# Patient Record
Sex: MALE | Race: WHITE | NOT HISPANIC OR LATINO | URBAN - METROPOLITAN AREA
[De-identification: names, ages, dates, MRNs, and addresses within clinical notes are randomized per-mention and may not be internally consistent; named-entity substitution may affect disease eponyms.]

---

## 2017-08-13 ENCOUNTER — INPATIENT (INPATIENT)
Facility: HOSPITAL | Age: 72
LOS: 5 days | Discharge: HOME | DRG: 872 | End: 2017-08-19
Attending: INTERNAL MEDICINE | Admitting: INTERNAL MEDICINE
Payer: MEDICARE

## 2017-08-13 VITALS
SYSTOLIC BLOOD PRESSURE: 123 MMHG | RESPIRATION RATE: 16 BRPM | DIASTOLIC BLOOD PRESSURE: 71 MMHG | HEIGHT: 70 IN | HEART RATE: 64 BPM | TEMPERATURE: 99 F | OXYGEN SATURATION: 95 % | WEIGHT: 190.04 LBS

## 2017-08-13 DIAGNOSIS — A41.9 SEPSIS, UNSPECIFIED ORGANISM: ICD-10-CM

## 2017-08-13 DIAGNOSIS — R41.0 DISORIENTATION, UNSPECIFIED: ICD-10-CM

## 2017-08-13 DIAGNOSIS — G93.40 ENCEPHALOPATHY, UNSPECIFIED: ICD-10-CM

## 2017-08-13 DIAGNOSIS — N30.00 ACUTE CYSTITIS WITHOUT HEMATURIA: ICD-10-CM

## 2017-08-13 DIAGNOSIS — Z95.5 PRESENCE OF CORONARY ANGIOPLASTY IMPLANT AND GRAFT: ICD-10-CM

## 2017-08-13 DIAGNOSIS — I25.10 ATHEROSCLEROTIC HEART DISEASE OF NATIVE CORONARY ARTERY WITHOUT ANGINA PECTORIS: ICD-10-CM

## 2017-08-13 DIAGNOSIS — I10 ESSENTIAL (PRIMARY) HYPERTENSION: ICD-10-CM

## 2017-08-13 DIAGNOSIS — R91.1 SOLITARY PULMONARY NODULE: ICD-10-CM

## 2017-08-13 DIAGNOSIS — Z85.51 PERSONAL HISTORY OF MALIGNANT NEOPLASM OF BLADDER: ICD-10-CM

## 2017-08-13 DIAGNOSIS — R50.9 FEVER, UNSPECIFIED: ICD-10-CM

## 2017-08-13 DIAGNOSIS — Z87.891 PERSONAL HISTORY OF NICOTINE DEPENDENCE: ICD-10-CM

## 2017-08-13 DIAGNOSIS — Z98.890 OTHER SPECIFIED POSTPROCEDURAL STATES: Chronic | ICD-10-CM

## 2017-08-13 DIAGNOSIS — I25.2 OLD MYOCARDIAL INFARCTION: ICD-10-CM

## 2017-08-13 DIAGNOSIS — N39.0 URINARY TRACT INFECTION, SITE NOT SPECIFIED: ICD-10-CM

## 2017-08-13 DIAGNOSIS — Z79.82 LONG TERM (CURRENT) USE OF ASPIRIN: ICD-10-CM

## 2017-08-13 DIAGNOSIS — F41.9 ANXIETY DISORDER, UNSPECIFIED: ICD-10-CM

## 2017-08-13 DIAGNOSIS — C67.9 MALIGNANT NEOPLASM OF BLADDER, UNSPECIFIED: ICD-10-CM

## 2017-08-13 DIAGNOSIS — Z95.5 PRESENCE OF CORONARY ANGIOPLASTY IMPLANT AND GRAFT: Chronic | ICD-10-CM

## 2017-08-13 DIAGNOSIS — Z29.9 ENCOUNTER FOR PROPHYLACTIC MEASURES, UNSPECIFIED: ICD-10-CM

## 2017-08-13 LAB
ALBUMIN SERPL ELPH-MCNC: 3.2 G/DL — LOW (ref 3.3–5)
ALP SERPL-CCNC: 54 U/L — SIGNIFICANT CHANGE UP (ref 40–120)
ALT FLD-CCNC: 18 U/L — SIGNIFICANT CHANGE UP (ref 12–78)
ANION GAP SERPL CALC-SCNC: 8 MMOL/L — SIGNIFICANT CHANGE UP (ref 5–17)
APPEARANCE UR: ABNORMAL
APTT BLD: 28.4 SEC — SIGNIFICANT CHANGE UP (ref 27.5–37.4)
AST SERPL-CCNC: 13 U/L — LOW (ref 15–37)
BACTERIA # UR AUTO: ABNORMAL
BASOPHILS # BLD AUTO: 0.1 K/UL — SIGNIFICANT CHANGE UP (ref 0–0.2)
BASOPHILS NFR BLD AUTO: 0.3 % — SIGNIFICANT CHANGE UP (ref 0–2)
BILIRUB SERPL-MCNC: 2 MG/DL — HIGH (ref 0.2–1.2)
BILIRUB UR-MCNC: ABNORMAL
BUN SERPL-MCNC: 21 MG/DL — SIGNIFICANT CHANGE UP (ref 7–23)
CALCIUM SERPL-MCNC: 10 MG/DL — SIGNIFICANT CHANGE UP (ref 8.5–10.1)
CHLORIDE SERPL-SCNC: 106 MMOL/L — SIGNIFICANT CHANGE UP (ref 96–108)
CO2 SERPL-SCNC: 24 MMOL/L — SIGNIFICANT CHANGE UP (ref 22–31)
COLOR SPEC: YELLOW — SIGNIFICANT CHANGE UP
COMMENT - URINE: SIGNIFICANT CHANGE UP
CREAT SERPL-MCNC: 1.2 MG/DL — SIGNIFICANT CHANGE UP (ref 0.5–1.3)
DIFF PNL FLD: ABNORMAL
EOSINOPHIL # BLD AUTO: 0 K/UL — SIGNIFICANT CHANGE UP (ref 0–0.5)
EOSINOPHIL NFR BLD AUTO: 0 % — SIGNIFICANT CHANGE UP (ref 0–6)
EPI CELLS # UR: SIGNIFICANT CHANGE UP
GLUCOSE SERPL-MCNC: 158 MG/DL — HIGH (ref 70–99)
GLUCOSE UR QL: NEGATIVE — SIGNIFICANT CHANGE UP
HCT VFR BLD CALC: 44.4 % — SIGNIFICANT CHANGE UP (ref 39–50)
HGB BLD-MCNC: 15.2 G/DL — SIGNIFICANT CHANGE UP (ref 13–17)
INR BLD: 1.2 RATIO — HIGH (ref 0.88–1.16)
KETONES UR-MCNC: ABNORMAL
LACTATE SERPL-SCNC: 1.5 MMOL/L — SIGNIFICANT CHANGE UP (ref 0.7–2)
LEUKOCYTE ESTERASE UR-ACNC: ABNORMAL
LYMPHOCYTES # BLD AUTO: 0.9 K/UL — LOW (ref 1–3.3)
LYMPHOCYTES # BLD AUTO: 5 % — LOW (ref 13–44)
MCHC RBC-ENTMCNC: 32.3 PG — SIGNIFICANT CHANGE UP (ref 27–34)
MCHC RBC-ENTMCNC: 34.1 GM/DL — SIGNIFICANT CHANGE UP (ref 32–36)
MCV RBC AUTO: 94.5 FL — SIGNIFICANT CHANGE UP (ref 80–100)
MONOCYTES # BLD AUTO: 1.3 K/UL — HIGH (ref 0–0.9)
MONOCYTES NFR BLD AUTO: 7.1 % — SIGNIFICANT CHANGE UP (ref 1–9)
NEUTROPHILS # BLD AUTO: 15.8 K/UL — HIGH (ref 1.8–7.4)
NEUTROPHILS NFR BLD AUTO: 87.6 % — HIGH (ref 43–77)
NITRITE UR-MCNC: POSITIVE
PH UR: 5 — SIGNIFICANT CHANGE UP (ref 5–8)
PLATELET # BLD AUTO: 161 K/UL — SIGNIFICANT CHANGE UP (ref 150–400)
POTASSIUM SERPL-MCNC: 4.1 MMOL/L — SIGNIFICANT CHANGE UP (ref 3.5–5.3)
POTASSIUM SERPL-SCNC: 4.1 MMOL/L — SIGNIFICANT CHANGE UP (ref 3.5–5.3)
PROT SERPL-MCNC: 7.5 G/DL — SIGNIFICANT CHANGE UP (ref 6–8.3)
PROT UR-MCNC: 75 MG/DL
PROTHROM AB SERPL-ACNC: 13.1 SEC — HIGH (ref 9.8–12.7)
RBC # BLD: 4.7 M/UL — SIGNIFICANT CHANGE UP (ref 4.2–5.8)
RBC # FLD: 11.6 % — SIGNIFICANT CHANGE UP (ref 10.3–14.5)
RBC CASTS # UR COMP ASSIST: ABNORMAL /HPF (ref 0–4)
SODIUM SERPL-SCNC: 138 MMOL/L — SIGNIFICANT CHANGE UP (ref 135–145)
SP GR SPEC: 1.02 — SIGNIFICANT CHANGE UP (ref 1.01–1.02)
UROBILINOGEN FLD QL: 4
WBC # BLD: 18 K/UL — HIGH (ref 3.8–10.5)
WBC # FLD AUTO: 18 K/UL — HIGH (ref 3.8–10.5)
WBC UR QL: ABNORMAL

## 2017-08-13 PROCEDURE — 99285 EMERGENCY DEPT VISIT HI MDM: CPT

## 2017-08-13 PROCEDURE — 93010 ELECTROCARDIOGRAM REPORT: CPT

## 2017-08-13 PROCEDURE — 71020: CPT | Mod: 26

## 2017-08-13 RX ORDER — LISINOPRIL 2.5 MG/1
20 TABLET ORAL DAILY
Qty: 0 | Refills: 0 | Status: DISCONTINUED | OUTPATIENT
Start: 2017-08-13 | End: 2017-08-13

## 2017-08-13 RX ORDER — ACETAMINOPHEN 500 MG
650 TABLET ORAL ONCE
Qty: 0 | Refills: 0 | Status: COMPLETED | OUTPATIENT
Start: 2017-08-13 | End: 2017-08-13

## 2017-08-13 RX ORDER — ASPIRIN/CALCIUM CARB/MAGNESIUM 324 MG
81 TABLET ORAL DAILY
Qty: 0 | Refills: 0 | Status: DISCONTINUED | OUTPATIENT
Start: 2017-08-13 | End: 2017-08-19

## 2017-08-13 RX ORDER — ASPIRIN/CALCIUM CARB/MAGNESIUM 324 MG
1 TABLET ORAL
Qty: 0 | Refills: 0 | COMMUNITY

## 2017-08-13 RX ORDER — RAMIPRIL 5 MG
1 CAPSULE ORAL
Qty: 0 | Refills: 0 | COMMUNITY

## 2017-08-13 RX ORDER — ACETAMINOPHEN 500 MG
650 TABLET ORAL EVERY 6 HOURS
Qty: 0 | Refills: 0 | Status: DISCONTINUED | OUTPATIENT
Start: 2017-08-13 | End: 2017-08-19

## 2017-08-13 RX ORDER — LACTOBACILLUS ACIDOPHILUS 100MM CELL
1 CAPSULE ORAL EVERY 8 HOURS
Qty: 0 | Refills: 0 | Status: DISCONTINUED | OUTPATIENT
Start: 2017-08-13 | End: 2017-08-19

## 2017-08-13 RX ORDER — CEFTRIAXONE 500 MG/1
1 INJECTION, POWDER, FOR SOLUTION INTRAMUSCULAR; INTRAVENOUS ONCE
Qty: 0 | Refills: 0 | Status: COMPLETED | OUTPATIENT
Start: 2017-08-13 | End: 2017-08-13

## 2017-08-13 RX ORDER — ATORVASTATIN CALCIUM 80 MG/1
1 TABLET, FILM COATED ORAL
Qty: 0 | Refills: 0 | COMMUNITY

## 2017-08-13 RX ORDER — ATORVASTATIN CALCIUM 80 MG/1
20 TABLET, FILM COATED ORAL AT BEDTIME
Qty: 0 | Refills: 0 | Status: DISCONTINUED | OUTPATIENT
Start: 2017-08-13 | End: 2017-08-19

## 2017-08-13 RX ORDER — CEFTRIAXONE 500 MG/1
1 INJECTION, POWDER, FOR SOLUTION INTRAMUSCULAR; INTRAVENOUS EVERY 24 HOURS
Qty: 0 | Refills: 0 | Status: DISCONTINUED | OUTPATIENT
Start: 2017-08-14 | End: 2017-08-16

## 2017-08-13 RX ORDER — CHOLECALCIFEROL (VITAMIN D3) 125 MCG
1 CAPSULE ORAL
Qty: 0 | Refills: 0 | COMMUNITY

## 2017-08-13 RX ORDER — ENOXAPARIN SODIUM 100 MG/ML
40 INJECTION SUBCUTANEOUS EVERY 24 HOURS
Qty: 0 | Refills: 0 | Status: DISCONTINUED | OUTPATIENT
Start: 2017-08-13 | End: 2017-08-19

## 2017-08-13 RX ORDER — ATENOLOL 25 MG/1
25 TABLET ORAL DAILY
Qty: 0 | Refills: 0 | Status: DISCONTINUED | OUTPATIENT
Start: 2017-08-13 | End: 2017-08-13

## 2017-08-13 RX ORDER — SODIUM CHLORIDE 9 MG/ML
1000 INJECTION INTRAMUSCULAR; INTRAVENOUS; SUBCUTANEOUS ONCE
Qty: 0 | Refills: 0 | Status: COMPLETED | OUTPATIENT
Start: 2017-08-13 | End: 2017-08-13

## 2017-08-13 RX ORDER — SODIUM CHLORIDE 9 MG/ML
1000 INJECTION INTRAMUSCULAR; INTRAVENOUS; SUBCUTANEOUS
Qty: 0 | Refills: 0 | Status: COMPLETED | OUTPATIENT
Start: 2017-08-13 | End: 2017-08-13

## 2017-08-13 RX ORDER — ATENOLOL 25 MG/1
25 TABLET ORAL DAILY
Qty: 0 | Refills: 0 | Status: DISCONTINUED | OUTPATIENT
Start: 2017-08-13 | End: 2017-08-19

## 2017-08-13 RX ORDER — SODIUM CHLORIDE 9 MG/ML
1000 INJECTION INTRAMUSCULAR; INTRAVENOUS; SUBCUTANEOUS
Qty: 0 | Refills: 0 | Status: DISCONTINUED | OUTPATIENT
Start: 2017-08-13 | End: 2017-08-16

## 2017-08-13 RX ORDER — ENOXAPARIN SODIUM 100 MG/ML
40 INJECTION SUBCUTANEOUS DAILY
Qty: 0 | Refills: 0 | Status: DISCONTINUED | OUTPATIENT
Start: 2017-08-13 | End: 2017-08-13

## 2017-08-13 RX ORDER — ATENOLOL 25 MG/1
1 TABLET ORAL
Qty: 0 | Refills: 0 | COMMUNITY

## 2017-08-13 RX ADMIN — SODIUM CHLORIDE 75 MILLILITER(S): 9 INJECTION INTRAMUSCULAR; INTRAVENOUS; SUBCUTANEOUS at 17:09

## 2017-08-13 RX ADMIN — Medication 1 TABLET(S): at 21:32

## 2017-08-13 RX ADMIN — Medication 650 MILLIGRAM(S): at 11:41

## 2017-08-13 RX ADMIN — Medication 650 MILLIGRAM(S): at 17:09

## 2017-08-13 RX ADMIN — SODIUM CHLORIDE 1000 MILLILITER(S): 9 INJECTION INTRAMUSCULAR; INTRAVENOUS; SUBCUTANEOUS at 11:41

## 2017-08-13 RX ADMIN — CEFTRIAXONE 100 GRAM(S): 500 INJECTION, POWDER, FOR SOLUTION INTRAMUSCULAR; INTRAVENOUS at 11:41

## 2017-08-13 RX ADMIN — Medication 81 MILLIGRAM(S): at 21:31

## 2017-08-13 RX ADMIN — SODIUM CHLORIDE 1000 MILLILITER(S): 9 INJECTION INTRAMUSCULAR; INTRAVENOUS; SUBCUTANEOUS at 11:42

## 2017-08-13 RX ADMIN — ATORVASTATIN CALCIUM 20 MILLIGRAM(S): 80 TABLET, FILM COATED ORAL at 21:31

## 2017-08-13 RX ADMIN — SODIUM CHLORIDE 2000 MILLILITER(S): 9 INJECTION INTRAMUSCULAR; INTRAVENOUS; SUBCUTANEOUS at 14:43

## 2017-08-13 NOTE — H&P ADULT - PROBLEM SELECTOR PLAN 1
WBC 18.1, T103.2F; /70; RR 14; O2 95%  Pt meets SIRS criteria at time of admission and has evidence of UTI on UA.   Plan:  continue ceftriaxone 1g IV until cultures finalized  continue Tylenol PRN for fevers  f/u urine, blood cultures  Zofran PRN for nausea/vomiting  Encourage PO intake of fluids as tolerated Likely 2/2 UTI  Admit to [facility]  continue ceftriaxone 1g IV until cultures finalized  continue Tylenol PRN for fevers  f/u urine, blood cultures  Zofran PRN for nausea/vomiting  Encourage PO intake of fluids as tolerated Likely 2/2 UTI  Admit to [facility]  continue ceftriaxone 1g IV until cultures finalized  continue Tylenol PRN for fevers  continue IV NS @ 75 ml/hr  f/u urine, blood cultures  Zofran PRN for nausea/vomiting  Encourage PO intake of fluids as tolerated

## 2017-08-13 NOTE — H&P ADULT - FAMILY HISTORY
Father  Still living? Unknown  Family history of pancreatic cancer, Age at diagnosis: Age Unknown  Family history of heart disease, Age at diagnosis: Age Unknown     Mother  Still living? Unknown  Family history of heart disease, Age at diagnosis: Age Unknown     Sibling  Still living? Unknown  Family history of colon cancer, Age at diagnosis: Age Unknown

## 2017-08-13 NOTE — H&P ADULT - PROBLEM SELECTOR PLAN 2
UA (+) LE, nitrites, moderate bacteria  Plan:  as above chronic; stable. Continue atorvastatin, atenolol chronic; stable. Continue atorvastatin, atenolol  abnormal ekg as noted- will contact his cardiologist's office in am tomorrow to have old ekg faxed

## 2017-08-13 NOTE — CONSULT NOTE ADULT - SUBJECTIVE AND OBJECTIVE BOX
coverage for Dr. Lan    Patient is a 72y old  Male who presents with a chief complaint of fever and chills x 2 days.    HISTORY OF PRESENT ILLNESS:  71 y/o male with history of bladder cancer, s/p TURBT 2013 by Dr. Lan, had surveillance cystoscopy 5 days ago, now c/o fevers and chills.    PAST MEDICAL & SURGICAL HISTORY:  Bladder cancer  HTN (hypertension)  H/O heart artery stent: x2 ()  S/P hernia surgery: ()      REVIEW OF SYSTEMS:    CONSTITUTIONAL: No weakness. (+) fevers and chills  SKIN: No itching, burning, rashes, or lesions   EYES/ENT: No visual changes;  No vertigo or throat pain   NECK: No pain or stiffness  RESPIRATORY: No cough, wheezing, hemoptysis; No shortness of breath  CARDIOVASCULAR: No chest pain or palpitations  GASTROINTESTINAL: No abdominal or epigastric pain. No nausea, vomiting, diarrhea  NEUROLOGICAL: No numbness or weakness    All other review of systems is negative unless indicated above.    MEDICATIONS  (STANDING):  aspirin  chewable 81 milliGRAM(s) Oral daily  lisinopril 20 milliGRAM(s) Oral daily  atorvastatin 20 milliGRAM(s) Oral at bedtime  ATENolol  Tablet 25 milliGRAM(s) Oral daily    Allergies    No Known Allergies      SOCIAL HISTORY:   Smoking: former smoker   Work:       FAMILY HISTORY:      Vital Signs Last 24 Hrs  T(C): 39.6 (13 Aug 2017 11:03), Max: 39.6 (13 Aug 2017 11:03)  T(F): 103.2 (13 Aug 2017 11:03), Max: 103.2 (13 Aug 2017 11:03)  HR: 66 (13 Aug 2017 11:03) (64 - 66)  BP: 120/70 (13 Aug 2017 11:03) (120/70 - 123/71)  BP(mean): --  RR: 16 (13 Aug 2017 11:03) (16 - 16)  SpO2: 96% (13 Aug 2017 11:03) (95% - 96%)    PHYSICAL EXAM:    Constitutional: NAD, well-developed  HEENT: PERRLA, EOMI  Neck: Supple, full ROM  Back: No CVA tenderness  Respiratory: Normal repiratory effort  Cardiovascular: RRR  Abd: Soft, NT/ND, no suprapubic distention or tenderness  : Normal phallus,open meatus,bilateral descended testes, non-tender  Extremities: No peripheral edema  Vascular: 2+ peripheral pulses  Neurological: A&O x 3, no focal deficits  Psychiatric: Normal mood, normal affect  Musculoskeletal: no clubbing/cyanosis/edema  Skin: No rashes    LABS:                        15.2   18.0  )-----------( 161      ( 13 Aug 2017 11:07 )             44.4     08-13    138  |  106  |  21  ----------------------------<  158<H>  4.1   |  24  |  1.20       PTT - ( 13 Aug 2017 11:07 )  PTT:28.4 sec  Urinalysis Basic - ( 13 Aug 2017 11:07 )    Color: Yellow / Appearance: Turbid / S.025 / pH: x  Gluc: x / Ketone: Small  / Bili: Small / Urobili: 4   Blood: x / Protein: 75 mg/dL / Nitrite: Positive   Leuk Esterase: Moderate / RBC: 3-5 /HPF / WBC 11-25   Sq Epi: x / Non Sq Epi: x / Bacteria: Moderate

## 2017-08-13 NOTE — ED ADULT NURSE REASSESSMENT NOTE - NS ED NURSE REASSESS COMMENT FT1
Sepsis resident, Nayeli RUIZ called and advised on potential sepsis indication as patient has temp of 103.2F

## 2017-08-13 NOTE — H&P ADULT - NSHPOUTPATIENTPROVIDERS_GEN_ALL_CORE
Dr. Victor M Lan (Urologist)  Dr. Peter Weil (PMD)  Dr. Dean Longo (Cardiologist)  Dr. Filipe Longo (GI)

## 2017-08-13 NOTE — H&P ADULT - HISTORY OF PRESENT ILLNESS
Pt is a 73 y/o M with PMHx of HTN, CAD (s/p 2 stents, 1995), bladder cancer (in remission; dx 2003, treated with TURBT and serial surveillance cystoscopies) who presented to the ED with 3 days of fever and chills after a routine screening cystoscopy performed 8/8 (5 days prior to presentation).  Pt states he did receive 1 dose of antibiotics at the time of cystoscopy but does not recall the name.  Pt stated that the he first felt a chill on Thursday but felt well enough to play baseball on Friday.  However, on Friday evening he began experiencing fever (per pt's wife Tmax 103F), chills, and drenching sweats.  He was given Advil/Tylenol by his wife for the fever.  Pt also endorses 2 episodes of non-bloody emesis prior to presentation.  He has noted decreased oral intake since Friday due to nausea/vomiting and has not be drinking a significant amount of fluid.  Patient ever having denied dysuria, frequency, incontinence, or urgency, but notes that it "feels different" when he urinates and that his urine is "orange." He also endorses dizziness when walking.      In the ED, pt was febrile (103.2F). All other vital signs WNL.  Labs were significant for WBC18.0 with left shift; UA (+) for LE and nitrite, moderate bacteria.  Lactate 1.5. Urine/blood cxs ordered. CXR: mild cardiomegaly; EKG not yet done. In the ED, pt received ceftriaxone 1g IV, Tylenol 650 mg, and NS Bolus x3. Pt is a 73 y/o M with PMHx of HTN, CAD (s/p 2 stents, 1995), bladder cancer (in remission; dx 2013, treated with TURBT and serial surveillance cystoscopies) who presented to the ED with 3 days of fever and chills after a routine screening cystoscopy performed 8/8 (5 days prior to presentation).  Pt states he did receive 1 dose of antibiotics at the time of cystoscopy but does not recall the name.  Pt stated that the he first felt a chill on Thursday but felt well enough to play baseball on Friday.  However, on Friday evening he began experiencing fever (per pt's wife Tmax 103F), chills, and drenching sweats.  He was given Advil/Tylenol by his wife for the fever.  Pt also endorses 2 episodes of non-bloody emesis prior to presentation.  He has noted decreased oral intake since Friday due to nausea/vomiting and has not be drinking a significant amount of fluid.  Patient ever having denied dysuria, frequency, incontinence, or urgency, but notes that it "feels different" when he urinates and that his urine is "orange." He also endorses dizziness when walking.      In the ED, pt was febrile (103.2F). All other vital signs WNL.  Labs were significant for WBC18.0 with left shift; UA (+) for LE and nitrite, moderate bacteria.  Lactate 1.5. Urine/blood cxs ordered. CXR: mild cardiomegaly; EKG not yet done. In the ED, pt received ceftriaxone 1g IV, Tylenol 650 mg, and NS Bolus x3.

## 2017-08-13 NOTE — ED PROVIDER NOTE - CARE PLAN
Principal Discharge DX:	Fever and chills Principal Discharge DX:	Fever and chills  Secondary Diagnosis:	UTI (urinary tract infection)

## 2017-08-13 NOTE — ED PROVIDER NOTE - OBJECTIVE STATEMENT
pt c/o 3 days of fevers and chills s/p cysto on 8/8. no ha, cp, sob, cough, abd pain, diarrhea, dysuria.  pmd - weil  uro Tono shane

## 2017-08-13 NOTE — ED ADULT NURSE NOTE - CHIEF COMPLAINT QUOTE
pt had cystoscopy Thursday and now having chills and vomiting pt had cystoscopy Tuesday and now having chills and vomiting

## 2017-08-13 NOTE — H&P ADULT - NSHPSOCIALHISTORY_GEN_ALL_CORE
Former smoker - quit 1985; 2-3 ppd x 27 years  Drinks alcohol ~1x/month; denies recreational or illicit drug use  Lives in house with wife; does not use any assistive devices   Currently working as ; owns exterminating business Former smoker - quit 1985; 2-3 ppd x 27 years  Drinks alcohol ~1x/month   Denies recreational or illicit drug use  Lives in house with wife; does not use any assistive devices   Currently working as ; owns exterminating business

## 2017-08-13 NOTE — H&P ADULT - ASSESSMENT
73 y/o M with PMH bladder CA, HTN, CAD admitted for sepsis 2/2 UTI. 71 y/o M with PMH bladder CA, HTN, CAD (s/p stent x2, 2003) admitted for sepsis 2/2 UTI. 71 y/o M with PMH bladder CA (diagnosed 2013; in remission), HTN, CAD (s/p stent x2, 2003) admitted for sepsis 2/2 UTI.

## 2017-08-13 NOTE — H&P ADULT - PROBLEM SELECTOR PLAN 3
2/2 sepsis/UTI  Plan:   Tylenol PRN for fevers Chronic. Continue atenolol with hold parameters. Chronic. Continue atenolol with hold parameters.  hold ace-i as bp is borderline

## 2017-08-13 NOTE — H&P ADULT - NSHPPHYSICALEXAM_GEN_ALL_CORE
Physical Exam:  General: Well developed, well nourished, NAD  HEENT: NCAT, EOMI bl, moist mucous membranes   Neck: Supple, nontender, no mass  Neurology: A&Ox3, nonfocal, CN II-XII grossly intact   Respiratory: CTA B/L, No W/R/R  CV: RRR, +S1/S2, no murmurs, rubs or gallops  Abdominal: Soft, NT, ND +BSx4, no suprapubic or CVA tenderness  Extremities: No C/C/E, + peripheral pulses  MSK: Normal ROM, no joint erythema or warmth, no joint swelling   Skin: warm, dry, normal color, no rash or abnormal lesions

## 2017-08-13 NOTE — H&P ADULT - NSHPREVIEWOFSYSTEMS_GEN_ALL_CORE
Denied chest pain, palpitations, SOB, cough, abdominal pain, diarrhea, constipation, urinary frequency, urgency, or dysuria, headaches, changes in vision, dizziness, numbness, tingling, recent travel, recent antibiotic use, or sick contacts.

## 2017-08-13 NOTE — H&P ADULT - PROBLEM SELECTOR PLAN 4
in remission. diagnosed 2003. Treated by Dr. Schroeder with TURBT/serial cysto. in remission. diagnosed 2013. Treated by Dr. Schroeder with TURBT/serial cysto.

## 2017-08-13 NOTE — H&P ADULT - PROBLEM SELECTOR PLAN 5
chronic; stable. Continue home medication regimen as above. Encourage patient ambulation as tolerated.   DVT Ppx Lovenox 40 SQ daily

## 2017-08-14 LAB
ANION GAP SERPL CALC-SCNC: 5 MMOL/L — SIGNIFICANT CHANGE UP (ref 5–17)
BUN SERPL-MCNC: 22 MG/DL — SIGNIFICANT CHANGE UP (ref 7–23)
CALCIUM SERPL-MCNC: 9.2 MG/DL — SIGNIFICANT CHANGE UP (ref 8.5–10.1)
CHLORIDE SERPL-SCNC: 109 MMOL/L — HIGH (ref 96–108)
CO2 SERPL-SCNC: 27 MMOL/L — SIGNIFICANT CHANGE UP (ref 22–31)
CREAT SERPL-MCNC: 1.1 MG/DL — SIGNIFICANT CHANGE UP (ref 0.5–1.3)
GLUCOSE SERPL-MCNC: 124 MG/DL — HIGH (ref 70–99)
HCT VFR BLD CALC: 38.6 % — LOW (ref 39–50)
HGB BLD-MCNC: 13 G/DL — SIGNIFICANT CHANGE UP (ref 13–17)
MCHC RBC-ENTMCNC: 32.4 PG — SIGNIFICANT CHANGE UP (ref 27–34)
MCHC RBC-ENTMCNC: 33.8 GM/DL — SIGNIFICANT CHANGE UP (ref 32–36)
MCV RBC AUTO: 95.9 FL — SIGNIFICANT CHANGE UP (ref 80–100)
PLATELET # BLD AUTO: 120 K/UL — LOW (ref 150–400)
POTASSIUM SERPL-MCNC: 4.3 MMOL/L — SIGNIFICANT CHANGE UP (ref 3.5–5.3)
POTASSIUM SERPL-SCNC: 4.3 MMOL/L — SIGNIFICANT CHANGE UP (ref 3.5–5.3)
RBC # BLD: 4.02 M/UL — LOW (ref 4.2–5.8)
RBC # FLD: 11.8 % — SIGNIFICANT CHANGE UP (ref 10.3–14.5)
SODIUM SERPL-SCNC: 141 MMOL/L — SIGNIFICANT CHANGE UP (ref 135–145)
WBC # BLD: 13.5 K/UL — HIGH (ref 3.8–10.5)
WBC # FLD AUTO: 13.5 K/UL — HIGH (ref 3.8–10.5)

## 2017-08-14 RX ORDER — AMIKACIN SULFATE 250 MG/ML
550 INJECTION, SOLUTION INTRAMUSCULAR; INTRAVENOUS ONCE
Qty: 0 | Refills: 0 | Status: COMPLETED | OUTPATIENT
Start: 2017-08-14 | End: 2017-08-14

## 2017-08-14 RX ADMIN — Medication 81 MILLIGRAM(S): at 11:24

## 2017-08-14 RX ADMIN — Medication 650 MILLIGRAM(S): at 13:06

## 2017-08-14 RX ADMIN — SODIUM CHLORIDE 75 MILLILITER(S): 9 INJECTION INTRAMUSCULAR; INTRAVENOUS; SUBCUTANEOUS at 05:19

## 2017-08-14 RX ADMIN — CEFTRIAXONE 100 GRAM(S): 500 INJECTION, POWDER, FOR SOLUTION INTRAMUSCULAR; INTRAVENOUS at 11:24

## 2017-08-14 RX ADMIN — AMIKACIN SULFATE 102.2 MILLIGRAM(S): 250 INJECTION, SOLUTION INTRAMUSCULAR; INTRAVENOUS at 18:53

## 2017-08-14 RX ADMIN — ENOXAPARIN SODIUM 40 MILLIGRAM(S): 100 INJECTION SUBCUTANEOUS at 11:24

## 2017-08-14 RX ADMIN — ATORVASTATIN CALCIUM 20 MILLIGRAM(S): 80 TABLET, FILM COATED ORAL at 21:44

## 2017-08-14 RX ADMIN — Medication 1 TABLET(S): at 21:44

## 2017-08-14 RX ADMIN — Medication 650 MILLIGRAM(S): at 22:08

## 2017-08-14 RX ADMIN — Medication 650 MILLIGRAM(S): at 00:29

## 2017-08-14 RX ADMIN — Medication 1 TABLET(S): at 05:17

## 2017-08-14 RX ADMIN — Medication 1 TABLET(S): at 13:06

## 2017-08-14 NOTE — PROGRESS NOTE ADULT - SUBJECTIVE AND OBJECTIVE BOX
PAST MEDICAL & SURGICAL HISTORY:  Bladder cancer  HTN (hypertension)  H/O heart artery stent: x2 ()  S/P hernia surgery: ()      REVIEW OF SYSTEMS:    MEDICATIONS  (STANDING):  aspirin  chewable 81 milliGRAM(s) Oral daily  atorvastatin 20 milliGRAM(s) Oral at bedtime  ATENolol  Tablet 25 milliGRAM(s) Oral daily  cefTRIAXone   IVPB 1 Gram(s) IV Intermittent every 24 hours  enoxaparin Injectable 40 milliGRAM(s) SubCutaneous every 24 hours  sodium chloride 0.9%. 1000 milliLiter(s) (75 mL/Hr) IV Continuous <Continuous>  lactobacillus acidophilus 1 Tablet(s) Oral every 8 hours    MEDICATIONS  (PRN):  acetaminophen   Tablet 650 milliGRAM(s) Oral every 6 hours PRN For Temp greater than 38 C (100.4 F)        Allergies    No Known Allergies    Intolerances        FAMILY HISTORY:  Family history of colon cancer (Sibling)  Family history of heart disease (Father, Mother)  Family history of pancreatic cancer (Father)      Vital Signs Last 24 Hrs  T(C): 37.2 (14 Aug 2017 04:22), Max: 39.6 (13 Aug 2017 11:03)  T(F): 99 (14 Aug 2017 04:22), Max: 103.2 (13 Aug 2017 11:03)  HR: 50 (14 Aug 2017 04:22) (50 - 66)  BP: 107/64 (14 Aug 2017 04:22) (95/50 - 123/71)  BP(mean): --  RR: 17 (14 Aug 2017 04:22) (16 - 17)  SpO2: 96% (14 Aug 2017 04:22) (95% - 100%)    PHYSICAL EXAM:    LABS:                        13.0   13.5  )-----------( 120      ( 14 Aug 2017 08:03 )             38.6     08-14    141  |  109<H>  |  22  ----------------------------<  124<H>  4.3   |  27  |  1.10    Ca    9.2      14 Aug 2017 08:03    TPro  7.5  /  Alb  3.2<L>  /  TBili  2.0<H>  /  DBili  x   /  AST  13<L>  /  ALT  18  /  AlkPhos  54  08-13    PT/INR - ( 13 Aug 2017 11:07 )   PT: 13.1 sec;   INR: 1.20 ratio         PTT - ( 13 Aug 2017 11:07 )  PTT:28.4 sec  Urinalysis Basic - ( 13 Aug 2017 11:07 )    Color: Yellow / Appearance: Turbid / S.025 / pH: x  Gluc: x / Ketone: Small  / Bili: Small / Urobili: 4   Blood: x / Protein: 75 mg/dL / Nitrite: Positive   Leuk Esterase: Moderate / RBC: 3-5 /HPF / WBC 11-25   Sq Epi: x / Non Sq Epi: x / Bacteria: Moderate      Urine Culture:     RADIOLOGY & ADDITIONAL STUDIES:

## 2017-08-14 NOTE — CONSULT NOTE ADULT - SUBJECTIVE AND OBJECTIVE BOX
[facility], Division of Infectious Diseases  [facility] [address] [place]    ADONIS, DESTINEE  72y, Male  [id number]    HPI--  72M with superficial bladder cancer 2013, being managed with surveillance cystoscopy last performed 6 days prior to admission ([facility] per patient). Initially did well, subsequently developed fevers, chills and rigors beginning the evening of 8/10. These were accompanied by night sweats and then developed nausea, vomiting, and hematuria. Denies any urinary frequency, urgency, hesitancy. No back or flank pain. No history of nephrolithiasis. Patient called urologist who instructed him to go to the Er. here given ceftriaxone. Still with fevers, though perhaps lower grade (103F ->     PMH/PSH--  Bladder cancer  HTN (hypertension)  H/O heart artery stent  S/P hernia surgery      Allergies--      Medications--  Antibiotics: cefTRIAXone   IVPB 1 Gram(s) IV Intermittent every 24 hours    Immunologic:   Other: aspirin  chewable  atorvastatin  ATENolol  Tablet  enoxaparin Injectable  acetaminophen   Tablet PRN  sodium chloride 0.9%.  lactobacillus acidophilus      Social History--  EtOH: denies ***  Tobacco: denies ***  Drug Use: denies ***    Family/Marital History--  Family history of colon cancer (Sibling)  Family history of heart disease (Father, Mother)  Family history of pancreatic cancer (Father)    Remainder not relevant to clinical concern.    Travel/Environmental/Occupational History:  *** inserth T/E/O Hx ***    Review of Systems:  A >=10-point review of systems was obtained.     Pertinent positives and negatives--  Constitutional: No fevers. No Chills. No Rigors.   Eyes:  ENMT:  Cardiovascular: No chest pain. No palpitations.  Respiratory: No shortness of breath. No cough.  Gastrointestinal: No nausea or vomiting. No diarrhea or constipation.   Genitourinary:  Musculoskeletal:  Skin:  Neurologic:  Psychiatric: Pleasant. Appropriate affect.  Endocrine:  Heme/Lymphatic:  Allergy/Immunologic:    Review of systems otherwise negative except as previously noted.    Physical Exam--  Vital Signs: T(F): 99 (08-14-17 @ 04:22), Max: 101.8 (08-13-17 @ 17:05)  HR: 50 (08-14-17 @ 04:22)  BP: 107/64 (08-14-17 @ 04:22)  RR: 17 (08-14-17 @ 04:22)  SpO2: 96% (08-14-17 @ 04:22)  Wt(kg): --  General: Nontoxic-appearing Male in no acute distress.  HEENT: AT/NC. PERRL. EOMI. Anicteric. Conjunctiva pink and moist. Oropharynx clear. Dentition fair.  Neck: Not rigid. No sense of mass.  Nodes: None palpable.  Lungs: Clear bilaterally without rales, wheezing or ronchi  Heart: Regular rate and rhythm. No Murmur. No rub. No gallop. No palpable thrill.  Abdomen: Bowel sounds present and normoactive. Soft. Nondistended. Nontender. No sense of mass. No organomegaly.  Back: No spinal tenderness. No costovertebral angle tenderness.   Extremities: No cyanosis or clubbing. No edema.   Skin: Warm. Dry. Good turgor. No rash. No vasculitic stigmata.  Psychiatric: Appropriate affect and mood for situation.         Laboratory & Imaging Data--  CBC                        13.0   13.5  )-----------( 120      ( 14 Aug 2017 08:03 )             38.6       Chemistries  08-14    141  |  109<H>  |  22  ----------------------------<  124<H>  4.3   |  27  |  1.10    Ca    9.2      14 Aug 2017 08:03    TPro  7.5  /  Alb  3.2<L>  /  TBili  2.0<H>  /  DBili  x   /  AST  13<L>  /  ALT  18  /  AlkPhos  54  08-13      Culture Data        Assessment--      Suggestions--        Kenny Khan MD  [phone] [facility], Division of Infectious Diseases  [facility] [address] [facility]    ADONIS, DESTINEE  72y, Male  [id number]    HPI--  72M with superficial bladder cancer 2013, being managed with surveillance cystoscopy last performed 6 days prior to admission ([facility] per patient). Initially did well, subsequently developed fevers, chills and rigors beginning the evening of 8/10. These were accompanied by night sweats and then developed nausea, vomiting, and hematuria. Denies any urinary frequency, urgency, hesitancy. No back or flank pain. No history of nephrolithiasis. Patient called urologist who instructed him to go to the Er. here given ceftriaxone. Still with fevers, though perhaps lower grade (103F -> 101F). Wife states patient slighltly confused compared with baseline.     PMH/PSH--  Bladder cancer  HTN (hypertension)  H/O heart artery stent  S/P hernia surgery      Allergies-- denies.       Medications--  Antibiotics: cefTRIAXone   IVPB 1 Gram(s) IV Intermittent every 24 hours    Immunologic:   Other: aspirin  chewable  atorvastatin  ATENolol  Tablet  enoxaparin Injectable  acetaminophen   Tablet PRN  sodium chloride 0.9%.  lactobacillus acidophilus      Social History--  EtOH: denies   Tobacco: denies   Drug Use: denies     Family/Marital History--  Family history of colon cancer (Sibling)  Family history of heart disease (Father, Mother)  Family history of pancreatic cancer (Father)    Remainder not relevant to clinical concern.    Review of Systems:  A >=10-point review of systems was obtained.     Pertinent positives and negatives--  Constitutional: No fevers. No Chills. No Rigors.   Eyes: denies.   ENMT: denies.   Cardiovascular: No chest pain. No palpitations.  Respiratory: No shortness of breath. No cough.  Gastrointestinal: No nausea or vomiting. No diarrhea or constipation.   Genitourinary: as above  Musculoskeletal: denies.   Skin: denies.   Neurologic: slightly confused  Psychiatric: Pleasant. Appropriate affect.    Review of systems otherwise negative except as previously noted.    Physical Exam--  Vital Signs: T(F): 99 (08-14-17 @ 04:22), Max: 101.8 (08-13-17 @ 17:05)  HR: 50 (08-14-17 @ 04:22)  BP: 107/64 (08-14-17 @ 04:22)  RR: 17 (08-14-17 @ 04:22)  SpO2: 96% (08-14-17 @ 04:22)  Wt(kg): --  General: Nontoxic-appearing Male in no acute distress. Sligltly confused.  HEENT: AT/NC. PERRL. EOMI. Anicteric. Conjunctiva pink and moist. Oropharynx clear. Dentition fair.  Neck: Not rigid. No sense of mass.  Nodes: None palpable.  Lungs: Clear bilaterally without rales, wheezing or ronchi  Heart: Regular rate and rhythm. No Murmur. No rub. No gallop. No palpable thrill.  Abdomen: Bowel sounds present and normoactive. Soft. Nondistended. Nontender. No sense of mass. No organomegaly.  Back: No spinal tenderness. No costovertebral angle tenderness.   Extremities: No cyanosis or clubbing. No edema.   Skin: Warm. Dry. Good turgor. No rash. No vasculitic stigmata.  Psychiatric: Appropriate affect and mood for situation.       Laboratory & Imaging Data--  CBC                        13.0   13.5  )-----------( 120      ( 14 Aug 2017 08:03 )             38.6   WBC Count: 18.0 K/uL (08.13.17 @ 11:07)      Chemistries  08-14    141  |  109<H>  |  22  ----------------------------<  124<H>  4.3   |  27  |  1.10    Ca    9.2      14 Aug 2017 08:03    TPro  7.5  /  Alb  3.2<L>  /  TBili  2.0<H>  /  DBili  x   /  AST  13<L>  /  ALT  18  /  AlkPhos  54  08-13      Culture Data  Culture - Urine (08.13.17 @ 14:13)    Specimen Source: .Urine Catheterized    Culture Results:   >100,000 CFU/ml Escherichia coli    Blood cultures pending    Assessment--  Sepsis  Urinary tract infection  S/P cystoscopy  Mild delirium due to acute infection  Temperature curve perhaps declining  WBC down slightly, no left shift initially.  Unremarkable exam    Suggestions--  F/U Cx  F/U CBC, temperature curve  Will give single dose of aminoglycoside  Monior mental status  Renal/Bladder US    Thank you for the courtesy of this referral.    Kenny Khan MD  [phone]        Kenny Khan MD  [phone]

## 2017-08-14 NOTE — PROGRESS NOTE ADULT - SUBJECTIVE AND OBJECTIVE BOX
Patient is a 72y old  Male who presents with a chief complaint of fevers/chills (13 Aug 2017 12:40)      INTERVAL HPI/OVERNIGHT EVENTS: overall starting to feel better.  febrile overnight  T(C): 37.2 (17 @ 04:22), Max: 38.8 (17 @ 17:05)  HR: 50 (17 @ 04:22) (50 - 61)  BP: 107/64 (17 @ 04:22) (95/50 - 116/68)  RR: 17 (17 @ 04:22) (16 - 17)  SpO2: 96% (17 @ 04:22) (95% - 100%)  Wt(kg): --  I&O's Summary    13 Aug 2017 07:01  -  14 Aug 2017 07:00  --------------------------------------------------------  IN: 1095 mL / OUT: 0 mL / NET: 1095 mL        LABS:                        13.0   13.5  )-----------( 120      ( 14 Aug 2017 08:03 )             38.6     08-14    141  |  109<H>  |  22  ----------------------------<  124<H>  4.3   |  27  |  1.10    Ca    9.2      14 Aug 2017 08:03    TPro  7.5  /  Alb  3.2<L>  /  TBili  2.0<H>  /  DBili  x   /  AST  13<L>  /  ALT  18  /  AlkPhos  54  08-13    PT/INR - ( 13 Aug 2017 11:07 )   PT: 13.1 sec;   INR: 1.20 ratio         PTT - ( 13 Aug 2017 11:07 )  PTT:28.4 sec  Urinalysis Basic - ( 13 Aug 2017 11:07 )    Color: Yellow / Appearance: Turbid / S.025 / pH: x  Gluc: x / Ketone: Small  / Bili: Small / Urobili: 4   Blood: x / Protein: 75 mg/dL / Nitrite: Positive   Leuk Esterase: Moderate / RBC: 3-5 /HPF / WBC 11-25   Sq Epi: x / Non Sq Epi: x / Bacteria: Moderate          Urinalysis Basic - ( 13 Aug 2017 11:07 )    Color: Yellow / Appearance: Turbid / S.025 / pH: x  Gluc: x / Ketone: Small  / Bili: Small / Urobili: 4   Blood: x / Protein: 75 mg/dL / Nitrite: Positive   Leuk Esterase: Moderate / RBC: 3-5 /HPF / WBC 11-25   Sq Epi: x / Non Sq Epi: x / Bacteria: Moderate        MEDICATIONS  (STANDING):  aspirin  chewable 81 milliGRAM(s) Oral daily  atorvastatin 20 milliGRAM(s) Oral at bedtime  ATENolol  Tablet 25 milliGRAM(s) Oral daily  cefTRIAXone   IVPB 1 Gram(s) IV Intermittent every 24 hours  enoxaparin Injectable 40 milliGRAM(s) SubCutaneous every 24 hours  sodium chloride 0.9%. 1000 milliLiter(s) (75 mL/Hr) IV Continuous <Continuous>  lactobacillus acidophilus 1 Tablet(s) Oral every 8 hours    MEDICATIONS  (PRN):  acetaminophen   Tablet 650 milliGRAM(s) Oral every 6 hours PRN For Temp greater than 38 C (100.4 F)      REVIEW OF SYSTEMS:  CONSTITUTIONAL: No fever, weight loss  EYES: No eye pain, visual disturbances, or discharge  ENMT:  No difficulty hearing, tinnitus, vertigo; No sinus or throat pain  NECK: No pain or stiffness  RESPIRATORY: No cough, wheezing, chills or hemoptysis; No shortness of breath  CARDIOVASCULAR: No chest pain, palpitations, dizziness, or leg swelling  GASTROINTESTINAL: No abdominal or epigastric pain. No nausea, vomiting, or hematemesis; No diarrhea or constipation. No melena or hematochezia.  GENITOURINARY: No dysuria, frequency, hematuria, or incontinence  NEUROLOGICAL: No headaches, memory loss, loss of strength, numbness, or tremors  SKIN: No itching, burning, rashes, or lesions   LYMPH NODES: No enlarged glands  ENDOCRINE: No heat or cold intolerance; No hair loss  MUSCULOSKELETAL: No joint pain or swelling; No muscle, back, or extremity pain  PSYCHIATRIC: No depression, anxiety, mood swings, or difficulty sleeping  HEME/LYMPH: No easy bruising, or bleeding gums  ALLERY AND IMMUNOLOGIC: No hives or eczema    RADIOLOGY & ADDITIONAL TESTS:    Imaging Personally Reviewed:  x[ ] YES  [ ] NO    Consultant(s) Notes Reviewed:  [ x] YES  [ ] NO    PHYSICAL EXAM:  GENERAL: NAD, well-groomed, well-developed  HEAD:  Atraumatic, Normocephalic  EYES: EOMI, PERRLA, conjunctiva and sclera clear  ENMT: No tonsillar erythema, exudates, or enlargement; Moist mucous membranes, Good dentition, No lesions  NECK: Supple, No JVD, Normal thyroid  NERVOUS SYSTEM:  Alert & Oriented X3, Good concentration; Motor Strength 5/5 B/L upper and lower extremities; DTRs 2+ intact and symmetric  CHEST/LUNG: Clear to percussion bilaterally; No rales, rhonchi, wheezing, or rubs  HEART: Regular rate and rhythm; No murmurs, rubs, or gallops  ABDOMEN: Soft, Nontender, Nondistended; Bowel sounds present  EXTREMITIES:  2+ Peripheral Pulses, No clubbing, cyanosis, or edema  LYMPH: No lymphadenopathy noted  SKIN: No rashes or lesions    Care Discussed with Consultants/Other Providers [ ] YES  [ ] NO

## 2017-08-14 NOTE — PROGRESS NOTE ADULT - ASSESSMENT
Still having episodes of chills and fever.  Temp 101.1 at approx 0030 hours overnight,  Voiding well, no dysuria.  Feels weak but denies myalgias.  Cultures pending. Continue same antibiotic pending culture results.

## 2017-08-15 LAB
-  AMIKACIN: SIGNIFICANT CHANGE UP
-  AMPICILLIN/SULBACTAM: SIGNIFICANT CHANGE UP
-  AMPICILLIN: SIGNIFICANT CHANGE UP
-  AZTREONAM: SIGNIFICANT CHANGE UP
-  CEFAZOLIN: SIGNIFICANT CHANGE UP
-  CEFEPIME: SIGNIFICANT CHANGE UP
-  CEFOXITIN: SIGNIFICANT CHANGE UP
-  CEFTAZIDIME: SIGNIFICANT CHANGE UP
-  CEFTRIAXONE: SIGNIFICANT CHANGE UP
-  CIPROFLOXACIN: SIGNIFICANT CHANGE UP
-  ERTAPENEM: SIGNIFICANT CHANGE UP
-  GENTAMICIN: SIGNIFICANT CHANGE UP
-  IMIPENEM: SIGNIFICANT CHANGE UP
-  LEVOFLOXACIN: SIGNIFICANT CHANGE UP
-  MEROPENEM: SIGNIFICANT CHANGE UP
-  NITROFURANTOIN: SIGNIFICANT CHANGE UP
-  PIPERACILLIN/TAZOBACTAM: SIGNIFICANT CHANGE UP
-  TOBRAMYCIN: SIGNIFICANT CHANGE UP
-  TRIMETHOPRIM/SULFAMETHOXAZOLE: SIGNIFICANT CHANGE UP
ANION GAP SERPL CALC-SCNC: 9 MMOL/L — SIGNIFICANT CHANGE UP (ref 5–17)
BUN SERPL-MCNC: 20 MG/DL — SIGNIFICANT CHANGE UP (ref 7–23)
CALCIUM SERPL-MCNC: 8.9 MG/DL — SIGNIFICANT CHANGE UP (ref 8.5–10.1)
CHLORIDE SERPL-SCNC: 107 MMOL/L — SIGNIFICANT CHANGE UP (ref 96–108)
CO2 SERPL-SCNC: 23 MMOL/L — SIGNIFICANT CHANGE UP (ref 22–31)
CREAT SERPL-MCNC: 1.2 MG/DL — SIGNIFICANT CHANGE UP (ref 0.5–1.3)
CULTURE RESULTS: SIGNIFICANT CHANGE UP
GLUCOSE SERPL-MCNC: 122 MG/DL — HIGH (ref 70–99)
HCT VFR BLD CALC: 36.7 % — LOW (ref 39–50)
HGB BLD-MCNC: 12.3 G/DL — LOW (ref 13–17)
MCHC RBC-ENTMCNC: 32 PG — SIGNIFICANT CHANGE UP (ref 27–34)
MCHC RBC-ENTMCNC: 33.5 GM/DL — SIGNIFICANT CHANGE UP (ref 32–36)
MCV RBC AUTO: 95.4 FL — SIGNIFICANT CHANGE UP (ref 80–100)
METHOD TYPE: SIGNIFICANT CHANGE UP
ORGANISM # SPEC MICROSCOPIC CNT: SIGNIFICANT CHANGE UP
ORGANISM # SPEC MICROSCOPIC CNT: SIGNIFICANT CHANGE UP
PLATELET # BLD AUTO: 112 K/UL — LOW (ref 150–400)
POTASSIUM SERPL-MCNC: 4.2 MMOL/L — SIGNIFICANT CHANGE UP (ref 3.5–5.3)
POTASSIUM SERPL-SCNC: 4.2 MMOL/L — SIGNIFICANT CHANGE UP (ref 3.5–5.3)
RBC # BLD: 3.85 M/UL — LOW (ref 4.2–5.8)
RBC # FLD: 11.8 % — SIGNIFICANT CHANGE UP (ref 10.3–14.5)
SODIUM SERPL-SCNC: 139 MMOL/L — SIGNIFICANT CHANGE UP (ref 135–145)
SPECIMEN SOURCE: SIGNIFICANT CHANGE UP
WBC # BLD: 6.8 K/UL — SIGNIFICANT CHANGE UP (ref 3.8–10.5)
WBC # FLD AUTO: 6.8 K/UL — SIGNIFICANT CHANGE UP (ref 3.8–10.5)

## 2017-08-15 PROCEDURE — 76770 US EXAM ABDO BACK WALL COMP: CPT | Mod: 26

## 2017-08-15 RX ORDER — LISINOPRIL 2.5 MG/1
10 TABLET ORAL DAILY
Qty: 0 | Refills: 0 | Status: DISCONTINUED | OUTPATIENT
Start: 2017-08-15 | End: 2017-08-19

## 2017-08-15 RX ORDER — IBUPROFEN 200 MG
600 TABLET ORAL ONCE
Qty: 0 | Refills: 0 | Status: COMPLETED | OUTPATIENT
Start: 2017-08-15 | End: 2017-08-15

## 2017-08-15 RX ADMIN — Medication 1 TABLET(S): at 21:02

## 2017-08-15 RX ADMIN — ENOXAPARIN SODIUM 40 MILLIGRAM(S): 100 INJECTION SUBCUTANEOUS at 11:36

## 2017-08-15 RX ADMIN — LISINOPRIL 10 MILLIGRAM(S): 2.5 TABLET ORAL at 13:37

## 2017-08-15 RX ADMIN — Medication 650 MILLIGRAM(S): at 21:01

## 2017-08-15 RX ADMIN — Medication 1 TABLET(S): at 13:38

## 2017-08-15 RX ADMIN — ATORVASTATIN CALCIUM 20 MILLIGRAM(S): 80 TABLET, FILM COATED ORAL at 21:02

## 2017-08-15 RX ADMIN — Medication 600 MILLIGRAM(S): at 22:57

## 2017-08-15 RX ADMIN — Medication 600 MILLIGRAM(S): at 23:57

## 2017-08-15 RX ADMIN — CEFTRIAXONE 100 GRAM(S): 500 INJECTION, POWDER, FOR SOLUTION INTRAMUSCULAR; INTRAVENOUS at 11:35

## 2017-08-15 RX ADMIN — Medication 81 MILLIGRAM(S): at 11:35

## 2017-08-15 RX ADMIN — Medication 650 MILLIGRAM(S): at 13:38

## 2017-08-15 RX ADMIN — Medication 1 TABLET(S): at 05:28

## 2017-08-15 NOTE — PROGRESS NOTE ADULT - SUBJECTIVE AND OBJECTIVE BOX
[facility], Division of Infectious Diseases  [facility] [address] [facility]  [phone]  Name: DESTINEE LAMB  Age: 72y  Gender: Male  MRN: [id number]    Interval History--  Notes reviewed  feels ok.  no pain.    Past Medical History--  Bladder cancer  HTN (hypertension)  H/O heart artery stent  S/P hernia surgery      For details regarding the patient's social history, family history, and other miscellaneous elements, please refer the initial infectious diseases consultation and/or the admitting history and physical examination for this admission.    Allergies    No Known Allergies    Intolerances        Medications--  Antibiotics:  cefTRIAXone   IVPB 1 Gram(s) IV Intermittent every 24 hours    Immunologic:    Other:  aspirin  chewable  atorvastatin  ATENolol  Tablet  enoxaparin Injectable  acetaminophen   Tablet PRN  sodium chloride 0.9%.  lactobacillus acidophilus  lisinopril      Review of Systems--  A 10-point review of systems was obtained.     Pertinent positives and negatives--  Constitutional: No fevers. No Chills. No Rigors.   Cardiovascular: No chest pain. No palpitations.  Respiratory: No shortness of breath. No cough.  Gastrointestinal: No nausea or vomiting. No diarrhea or constipation.     Review of systems otherwise negative except as previously noted.    Physical Examination--  Vital Signs: T(F): 100.1 (08-15-17 @ 10:05), Max: 103.8 (08-14-17 @ 13:00)  HR: 56 (08-15-17 @ 04:29)  BP: 128/78 (08-15-17 @ 04:29)  RR: 16 (08-15-17 @ 04:29)  SpO2: 94% (08-15-17 @ 04:29)  Wt(kg): --  General: Nontoxic-appearing Male in no acute distress.  HEENT: AT/NC. . Anicteric. Conjunctiva pink and moist. Oropharynx clear. Dentition fair.  Neck: Not rigid. No sense of mass.  Nodes: None palpable.  Lungs: Clear bilaterally without rales, wheezing or rhonchi  Heart: Regular rate and rhythm. No Murmur. No rub. No gallop. No palpable thrill.  Abdomen: Bowel sounds present and normoactive. Soft. Nondistended. Nontender. No sense of mass. No organomegaly.  Back: No spinal tenderness. No costovertebral angle tenderness.   Extremities: No cyanosis or clubbing. No edema.   Skin: Warm. Dry. Good turgor. No rash. No vasculitic stigmata.         Laboratory Studies--  CBC                        12.3   6.8   )-----------( 112      ( 15 Aug 2017 07:44 )             36.7       Chemistries      139  |  107  |  20  ----------------------------<  122<H>  4.2   |  23  |  1.20    Ca    8.9      15 Aug 2017 07:44    TPro  7.5  /  Alb  3.2<L>  /  TBili  2.0<H>  /  DBili  x   /  AST  13<L>  /  ALT  18  /  AlkPhos  54  08-13      Culture Data    Culture - Urine (08.13.17 @ 14:13)    -  Cefazolin: S <=2    -  Cefoxitin: S <=4    -  Ertapenem: S <=0.5    -  Gentamicin: S <=1    -  Levofloxacin: S <=1    -  Trimethoprim/Sulfamethoxazole: R >2/38    -  Amikacin: S <=8    -  Ampicillin: R >16    -  Aztreonam: S <=4    -  Piperacillin/Tazobactam: S <=8    -  Tobramycin: S <=2    -  Ampicillin/Sulbactam: I 16/8    -  Ceftazidime: S <=1    -  Imipenem: S <=1    -  Meropenem: S <=1    -  Cefepime: S <=2    -  Ceftriaxone: S <=1    -  Ciprofloxacin: S <=0.5    -  Nitrofurantoin: S <=32    Specimen Source: .Urine Catheterized    Culture Results:   >100,000 CFU/ml Escherichia coli    Organism Identification: Escherichia coli    Organism: Escherichia coli    Method Type: RAMÓN        08-15    Culture - Blood (08.13.17 @ 14:10)    Specimen Source: .Blood Blood-Peripheral    Culture Results:   No growth to date.    < from: US Kidney and Bladder (08.15.17 @ 08:24) >  EXAM:  US KIDNEYS AND BLADDER                            PROCEDURE DATE:  08/15/2017          INTERPRETATION:  History: Sepsis, UTI.    Bilateral renal ultrasound.    Right kidney 12.1 left 11.8 cm long dimension. Normal cortical volume   echotexture. No hydronephrosis shadowing calculus solid or cystic mass   bilaterally.  Bladder distended up to 57 cc. Patient did not void.  Incidental gallstones.    Impression: No hydronephrosis.          < end of copied text >

## 2017-08-15 NOTE — PROGRESS NOTE ADULT - SUBJECTIVE AND OBJECTIVE BOX
Patient is a 72y old  Male who presents with a chief complaint of fevers/chills (13 Aug 2017 12:40)      INTERVAL HPI/OVERNIGHT EVENTS: febrile overnight as noted  T(C): 37.8 (08-15-17 @ 10:05), Max: 39.9 (17 @ 13:00)  HR: 56 (08-15-17 @ 04:29) (56 - 60)  BP: 128/78 (08-15-17 @ 04:29) (111/54 - 128/78)  RR: 16 (08-15-17 @ 04:29) (15 - 17)  SpO2: 94% (08-15-17 @ 04:29) (94% - 98%)  Wt(kg): --  I&O's Summary    14 Aug 2017 07:01  -  15 Aug 2017 07:00  --------------------------------------------------------  IN: 1130 mL / OUT: 400 mL / NET: 730 mL        LABS:                        12.3   6.8   )-----------( 112      ( 15 Aug 2017 07:44 )             36.7     08-15    139  |  107  |  20  ----------------------------<  122<H>  4.2   |  23  |  1.20    Ca    8.9      15 Aug 2017 07:44    TPro  7.5  /  Alb  3.2<L>  /  TBili  2.0<H>  /  DBili  x   /  AST  13<L>  /  ALT  18  /  AlkPhos  54  08-13    PT/INR - ( 13 Aug 2017 11:07 )   PT: 13.1 sec;   INR: 1.20 ratio         PTT - ( 13 Aug 2017 11:07 )  PTT:28.4 sec  Urinalysis Basic - ( 13 Aug 2017 11:07 )    Color: Yellow / Appearance: Turbid / S.025 / pH: x  Gluc: x / Ketone: Small  / Bili: Small / Urobili: 4   Blood: x / Protein: 75 mg/dL / Nitrite: Positive   Leuk Esterase: Moderate / RBC: 3-5 /HPF / WBC 11-25   Sq Epi: x / Non Sq Epi: x / Bacteria: Moderate      CAPILLARY BLOOD GLUCOSE            Urinalysis Basic - ( 13 Aug 2017 11:07 )    Color: Yellow / Appearance: Turbid / S.025 / pH: x  Gluc: x / Ketone: Small  / Bili: Small / Urobili: 4   Blood: x / Protein: 75 mg/dL / Nitrite: Positive   Leuk Esterase: Moderate / RBC: 3-5 /HPF / WBC 11-25   Sq Epi: x / Non Sq Epi: x / Bacteria: Moderate        MEDICATIONS  (STANDING):  aspirin  chewable 81 milliGRAM(s) Oral daily  atorvastatin 20 milliGRAM(s) Oral at bedtime  ATENolol  Tablet 25 milliGRAM(s) Oral daily  cefTRIAXone   IVPB 1 Gram(s) IV Intermittent every 24 hours  enoxaparin Injectable 40 milliGRAM(s) SubCutaneous every 24 hours  sodium chloride 0.9%. 1000 milliLiter(s) (75 mL/Hr) IV Continuous <Continuous>  lactobacillus acidophilus 1 Tablet(s) Oral every 8 hours  lisinopril 10 milliGRAM(s) Oral daily    MEDICATIONS  (PRN):  acetaminophen   Tablet 650 milliGRAM(s) Oral every 6 hours PRN For Temp greater than 38 C (100.4 F)      REVIEW OF SYSTEMS:  CONSTITUTIONAL: No fever, weight loss, or fatigue  EYES: No eye pain, visual disturbances, or discharge  ENMT:  No difficulty hearing, tinnitus, vertigo; No sinus or throat pain  NECK: No pain or stiffness  RESPIRATORY: No cough, wheezing, chills or hemoptysis; No shortness of breath  CARDIOVASCULAR: No chest pain, palpitations, dizziness, or leg swelling  GASTROINTESTINAL: No abdominal or epigastric pain. No nausea, vomiting, or hematemesis; No diarrhea or constipation. No melena or hematochezia.  GENITOURINARY: No dysuria, frequency, hematuria, or incontinence  NEUROLOGICAL: No headaches, memory loss, loss of strength, numbness, or tremors  SKIN: No itching, burning, rashes, or lesions   LYMPH NODES: No enlarged glands  ENDOCRINE: No heat or cold intolerance; No hair loss  MUSCULOSKELETAL: No joint pain or swelling; No muscle, back, or extremity pain  PSYCHIATRIC: No depression, anxiety, mood swings, or difficulty sleeping  HEME/LYMPH: No easy bruising, or bleeding gums  ALLERY AND IMMUNOLOGIC: No hives or eczema    RADIOLOGY & ADDITIONAL TESTS:    Imaging Personally Reviewed:  [ ] YES  [ ] NO    Consultant(s) Notes Reviewed:  [ ] YES  [ ] NO    PHYSICAL EXAM:  GENERAL: NAD, well-groomed, well-developed  HEAD:  Atraumatic, Normocephalic  EYES: EOMI, PERRLA, conjunctiva and sclera clear  ENMT: No tonsillar erythema, exudates, or enlargement; Moist mucous membranes, Good dentition, No lesions  NECK: Supple, No JVD, Normal thyroid  NERVOUS SYSTEM:  Alert & Oriented X3, Good concentration; Motor Strength 5/5 B/L upper and lower extremities; DTRs 2+ intact and symmetric  CHEST/LUNG: Clear to percussion bilaterally; No rales, rhonchi, wheezing, or rubs  HEART: Regular rate and rhythm; No murmurs, rubs, or gallops  ABDOMEN: Soft, Nontender, Nondistended; Bowel sounds present  EXTREMITIES:  2+ Peripheral Pulses, No clubbing, cyanosis, or edema  LYMPH: No lymphadenopathy noted  SKIN: No rashes or lesions    Care Discussed with Consultants/Other Providers [ ] YES  [ ] NO

## 2017-08-15 NOTE — PROGRESS NOTE ADULT - ASSESSMENT
72M admitted with sepsis secondary to uti  clinically better, mental status at baseline  leukocytosis resolving

## 2017-08-15 NOTE — PROGRESS NOTE ADULT - SUBJECTIVE AND OBJECTIVE BOX
INTERVAL HPI/OVERNIGHT EVENTS: Patient has been feeling better. Voiding normally    MEDICATIONS  (STANDING):  aspirin  chewable 81 milliGRAM(s) Oral daily  atorvastatin 20 milliGRAM(s) Oral at bedtime  ATENolol  Tablet 25 milliGRAM(s) Oral daily  cefTRIAXone   IVPB 1 Gram(s) IV Intermittent every 24 hours  enoxaparin Injectable 40 milliGRAM(s) SubCutaneous every 24 hours  sodium chloride 0.9%. 1000 milliLiter(s) (75 mL/Hr) IV Continuous <Continuous>  lactobacillus acidophilus 1 Tablet(s) Oral every 8 hours  lisinopril 10 milliGRAM(s) Oral daily    MEDICATIONS  (PRN):  acetaminophen   Tablet 650 milliGRAM(s) Oral every 6 hours PRN For Temp greater than 38 C (100.4 F)        Vital Signs Last 24 Hrs  T(C): 37.8 (15 Aug 2017 10:05), Max: 39.9 (14 Aug 2017 13:00)  T(F): 100.1 (15 Aug 2017 10:05), Max: 103.8 (14 Aug 2017 13:00)  HR: 56 (15 Aug 2017 04:29) (56 - 60)  BP: 128/78 (15 Aug 2017 04:29) (111/54 - 128/78)  BP(mean): --  RR: 16 (15 Aug 2017 04:29) (15 - 17)  SpO2: 94% (15 Aug 2017 04:29) (94% - 98%)    PHYSICAL EXAM:    ABDOMEN:Soft, NT/ND No SP distention    LABS:                        12.3   6.8   )-----------( 112      ( 15 Aug 2017 07:44 )             36.7     08-15    139  |  107  |  20  ----------------------------<  122<H>  4.2   |  23  |  1.20    Ca    8.9      15 Aug 2017 07:44              RADIOLOGY & ADDITIONAL TESTS:

## 2017-08-15 NOTE — CHART NOTE - NSCHARTNOTEFT_GEN_A_CORE
71 yo M with PMHx of bladder cancer, HTN, CAD, admitted for sepsis secondary to UTI.  Called by RN for temperature of 103.2 taken rectally.   Pt. examined at bedside. He is resting comfortably and has no complaints at this time. As per the nurse and the daughter, he is more confused than he is normally, as he was not able to name all his grandchildren when prompted. However, he is able to follow instructions without any issue. He states that he is feeling okay. As per daughter, patient was having chills earlier. He denies any chest pain, shortness of breath, abdominal pain, nausea, or vomiting at this time. He has no further complaints.    Vital Signs Last 24 Hrs  T(C): 39.6 (15 Aug 2017 21:25), Max: 39.9 (14 Aug 2017 22:09)  T(F): 103.3 (15 Aug 2017 21:25), Max: 103.8 (14 Aug 2017 22:09)  HR: 66 (15 Aug 2017 21:25) (56 - 66)  BP: 159/75 (15 Aug 2017 21:25) (118/67 - 159/75)  BP(mean): --  RR: 17 (15 Aug 2017 21:25) (15 - 17)  SpO2: 96% (15 Aug 2017 21:25) (94% - 98%)    PE:    Gen: AAOx3, NAD  Neuro: PERRL, good concentration, no focal deficits  Cardio: RRR, Normal S1 and S2, no murmurs  Lungs: CTA b/l  Abdomen: Soft, nontender, nondistended; bowel sounds present b/l  Ext: No peripheral edema    A/P: 71 yo M with PMHx of bladder cancer, HTN, CAD, admitted for sepsis secondary to UTI. Currently being treated with ceftriaxone. Pt has a fever of 103 accompanied by chills.   - Tylenol 650mg was given by RN  - Will recheck temperature   - No imaging warranted at this time as pt is AAOx3   - Will consider changing Tylenol to Q4 instead of Q6 if fevers persist  - Nurse made aware, follow up as needed

## 2017-08-16 DIAGNOSIS — R41.0 DISORIENTATION, UNSPECIFIED: ICD-10-CM

## 2017-08-16 LAB
BASOPHILS # BLD AUTO: 0 K/UL — SIGNIFICANT CHANGE UP (ref 0–0.2)
BASOPHILS NFR BLD AUTO: 0.7 % — SIGNIFICANT CHANGE UP (ref 0–2)
EOSINOPHIL # BLD AUTO: 0 K/UL — SIGNIFICANT CHANGE UP (ref 0–0.5)
EOSINOPHIL NFR BLD AUTO: 0.6 % — SIGNIFICANT CHANGE UP (ref 0–6)
HCT VFR BLD CALC: 39.9 % — SIGNIFICANT CHANGE UP (ref 39–50)
HGB BLD-MCNC: 13.5 G/DL — SIGNIFICANT CHANGE UP (ref 13–17)
LACTATE SERPL-SCNC: 1.4 MMOL/L — SIGNIFICANT CHANGE UP (ref 0.7–2)
LYMPHOCYTES # BLD AUTO: 0.4 K/UL — LOW (ref 1–3.3)
LYMPHOCYTES # BLD AUTO: 8.6 % — LOW (ref 13–44)
MCHC RBC-ENTMCNC: 32.4 PG — SIGNIFICANT CHANGE UP (ref 27–34)
MCHC RBC-ENTMCNC: 33.9 GM/DL — SIGNIFICANT CHANGE UP (ref 32–36)
MCV RBC AUTO: 95.8 FL — SIGNIFICANT CHANGE UP (ref 80–100)
MONOCYTES # BLD AUTO: 0.4 K/UL — SIGNIFICANT CHANGE UP (ref 0–0.9)
MONOCYTES NFR BLD AUTO: 7.1 % — SIGNIFICANT CHANGE UP (ref 1–9)
NEUTROPHILS # BLD AUTO: 4.3 K/UL — SIGNIFICANT CHANGE UP (ref 1.8–7.4)
NEUTROPHILS NFR BLD AUTO: 83 % — HIGH (ref 43–77)
PLATELET # BLD AUTO: 120 K/UL — LOW (ref 150–400)
PROCALCITONIN SERPL-MCNC: 0.2 NG/ML — HIGH (ref 0–0.04)
RBC # BLD: 4.17 M/UL — LOW (ref 4.2–5.8)
RBC # FLD: 12.3 % — SIGNIFICANT CHANGE UP (ref 10.3–14.5)
WBC # BLD: 5.1 K/UL — SIGNIFICANT CHANGE UP (ref 3.8–10.5)
WBC # FLD AUTO: 5.1 K/UL — SIGNIFICANT CHANGE UP (ref 3.8–10.5)

## 2017-08-16 PROCEDURE — 71260 CT THORAX DX C+: CPT | Mod: 26

## 2017-08-16 PROCEDURE — 74177 CT ABD & PELVIS W/CONTRAST: CPT | Mod: 26

## 2017-08-16 PROCEDURE — 71020: CPT | Mod: 26

## 2017-08-16 RX ORDER — PIPERACILLIN AND TAZOBACTAM 4; .5 G/20ML; G/20ML
3.38 INJECTION, POWDER, LYOPHILIZED, FOR SOLUTION INTRAVENOUS EVERY 8 HOURS
Qty: 0 | Refills: 0 | Status: DISCONTINUED | OUTPATIENT
Start: 2017-08-16 | End: 2017-08-19

## 2017-08-16 RX ORDER — SODIUM CHLORIDE 9 MG/ML
1000 INJECTION INTRAMUSCULAR; INTRAVENOUS; SUBCUTANEOUS
Qty: 0 | Refills: 0 | Status: DISCONTINUED | OUTPATIENT
Start: 2017-08-16 | End: 2017-08-17

## 2017-08-16 RX ORDER — VANCOMYCIN HCL 1 G
1250 VIAL (EA) INTRAVENOUS EVERY 12 HOURS
Qty: 0 | Refills: 0 | Status: DISCONTINUED | OUTPATIENT
Start: 2017-08-17 | End: 2017-08-18

## 2017-08-16 RX ORDER — PIPERACILLIN AND TAZOBACTAM 4; .5 G/20ML; G/20ML
INJECTION, POWDER, LYOPHILIZED, FOR SOLUTION INTRAVENOUS
Qty: 0 | Refills: 0 | Status: DISCONTINUED | OUTPATIENT
Start: 2017-08-16 | End: 2017-08-19

## 2017-08-16 RX ORDER — VANCOMYCIN HCL 1 G
1250 VIAL (EA) INTRAVENOUS ONCE
Qty: 0 | Refills: 0 | Status: COMPLETED | OUTPATIENT
Start: 2017-08-16 | End: 2017-08-16

## 2017-08-16 RX ORDER — IOHEXOL 300 MG/ML
500 INJECTION, SOLUTION INTRAVENOUS
Qty: 0 | Refills: 0 | Status: DISCONTINUED | OUTPATIENT
Start: 2017-08-16 | End: 2017-08-16

## 2017-08-16 RX ORDER — VANCOMYCIN HCL 1 G
VIAL (EA) INTRAVENOUS
Qty: 0 | Refills: 0 | Status: DISCONTINUED | OUTPATIENT
Start: 2017-08-16 | End: 2017-08-18

## 2017-08-16 RX ORDER — IBUPROFEN 200 MG
400 TABLET ORAL ONCE
Qty: 0 | Refills: 0 | Status: COMPLETED | OUTPATIENT
Start: 2017-08-16 | End: 2017-08-16

## 2017-08-16 RX ORDER — IOHEXOL 300 MG/ML
500 INJECTION, SOLUTION INTRAVENOUS
Qty: 0 | Refills: 0 | Status: COMPLETED | OUTPATIENT
Start: 2017-08-16 | End: 2017-08-16

## 2017-08-16 RX ORDER — IOHEXOL 300 MG/ML
30 INJECTION, SOLUTION INTRAVENOUS ONCE
Qty: 0 | Refills: 0 | Status: DISCONTINUED | OUTPATIENT
Start: 2017-08-16 | End: 2017-08-16

## 2017-08-16 RX ORDER — PIPERACILLIN AND TAZOBACTAM 4; .5 G/20ML; G/20ML
3.38 INJECTION, POWDER, LYOPHILIZED, FOR SOLUTION INTRAVENOUS ONCE
Qty: 0 | Refills: 0 | Status: COMPLETED | OUTPATIENT
Start: 2017-08-16 | End: 2017-08-16

## 2017-08-16 RX ADMIN — Medication 1 TABLET(S): at 05:29

## 2017-08-16 RX ADMIN — PIPERACILLIN AND TAZOBACTAM 25 GRAM(S): 4; .5 INJECTION, POWDER, LYOPHILIZED, FOR SOLUTION INTRAVENOUS at 16:12

## 2017-08-16 RX ADMIN — IOHEXOL 500 MILLILITER(S): 300 INJECTION, SOLUTION INTRAVENOUS at 15:02

## 2017-08-16 RX ADMIN — Medication 1 TABLET(S): at 13:32

## 2017-08-16 RX ADMIN — CEFTRIAXONE 100 GRAM(S): 500 INJECTION, POWDER, FOR SOLUTION INTRAMUSCULAR; INTRAVENOUS at 11:15

## 2017-08-16 RX ADMIN — Medication 1 TABLET(S): at 21:24

## 2017-08-16 RX ADMIN — Medication 650 MILLIGRAM(S): at 10:06

## 2017-08-16 RX ADMIN — SODIUM CHLORIDE 125 MILLILITER(S): 9 INJECTION INTRAMUSCULAR; INTRAVENOUS; SUBCUTANEOUS at 12:37

## 2017-08-16 RX ADMIN — ATORVASTATIN CALCIUM 20 MILLIGRAM(S): 80 TABLET, FILM COATED ORAL at 21:24

## 2017-08-16 RX ADMIN — IOHEXOL 500 MILLILITER(S): 300 INJECTION, SOLUTION INTRAVENOUS at 16:12

## 2017-08-16 RX ADMIN — SODIUM CHLORIDE 75 MILLILITER(S): 9 INJECTION INTRAMUSCULAR; INTRAVENOUS; SUBCUTANEOUS at 03:30

## 2017-08-16 RX ADMIN — Medication 650 MILLIGRAM(S): at 17:41

## 2017-08-16 RX ADMIN — Medication 400 MILLIGRAM(S): at 21:49

## 2017-08-16 RX ADMIN — PIPERACILLIN AND TAZOBACTAM 25 GRAM(S): 4; .5 INJECTION, POWDER, LYOPHILIZED, FOR SOLUTION INTRAVENOUS at 21:25

## 2017-08-16 RX ADMIN — Medication 166.67 MILLIGRAM(S): at 14:59

## 2017-08-16 RX ADMIN — Medication 81 MILLIGRAM(S): at 11:59

## 2017-08-16 RX ADMIN — ENOXAPARIN SODIUM 40 MILLIGRAM(S): 100 INJECTION SUBCUTANEOUS at 11:15

## 2017-08-16 NOTE — PROGRESS NOTE ADULT - ASSESSMENT
NOTE THAT THIS PATIENT ENCOUNTER OCCURRED AT APPROX 0830 HOURS TODAY    Tm 101.4 overnight, and that time pt had shaking chills.  Has some recurrence of the shakes at this time.  WBC is 5100, urine growing E coli, blood cultures neg.  Pt also has new c/o slight wheezing as well as a non productive cough.  Will order CXR 2 views and urine and blood cultures.  Denies dysuria at this time.

## 2017-08-16 NOTE — PROGRESS NOTE ADULT - ASSESSMENT
Persistent fever, though WBC has normalized.   I would have expected patient to defervesce with 'just' a UTI  Sensitive buster on UC&S.  R/O compllication related to procedure  Pneumonia seems absent on exam presently.  Doubt Lyme & Leptospirosis  No evidence of SSTI  NO IV catheter phelbitis.  Benign abdomen  Doubt primary CNS infection, presentation seems more consistent with delirium than encephalitis.

## 2017-08-16 NOTE — PROGRESS NOTE ADULT - SUBJECTIVE AND OBJECTIVE BOX
Patient is a 72y old  Male who presents with a chief complaint of fevers/chills (13 Aug 2017 12:40)      INTERVAL HPI/OVERNIGHT EVENTS:with fevers everynight, today with recurrent fever during day    MEDICATIONS  (STANDING):  aspirin  chewable 81 milliGRAM(s) Oral daily  atorvastatin 20 milliGRAM(s) Oral at bedtime  ATENolol  Tablet 25 milliGRAM(s) Oral daily  cefTRIAXone   IVPB 1 Gram(s) IV Intermittent every 24 hours  enoxaparin Injectable 40 milliGRAM(s) SubCutaneous every 24 hours  lactobacillus acidophilus 1 Tablet(s) Oral every 8 hours  lisinopril 10 milliGRAM(s) Oral daily  sodium chloride 0.9%. 1000 milliLiter(s) (125 mL/Hr) IV Continuous <Continuous>    MEDICATIONS  (PRN):  acetaminophen   Tablet 650 milliGRAM(s) Oral every 6 hours PRN For Temp greater than 38 C (100.4 F)      Allergies    No Known Allergies    Intolerances        REVIEW OF SYSTEMS:  CONSTITUTIONAL:fever  EYES: No eye pain, visual disturbances  ENMT:  No difficulty hearing, tinnitus, vertigo; No sinus or throat pain  NECK: No pain or stiffness  RESPIRATORY: No cough, wheezing, chills or hemoptysis; No shortness of breath  CARDIOVASCULAR: No chest pain, palpitations, dizziness  GASTROINTESTINAL: No abdominal or epigastric pain. No nausea, vomiting, or hematemesis; No diarrhea or constipation. No melena or hematochezia.  GENITOURINARY: No dysuria, frequency, hematuria, or incontinence  NEUROLOGICAL: No headaches, memory loss, loss of strength, numbness, or tremors  SKIN: No itching, burning  LYMPH NODES: No enlarged glands  MUSCULOSKELETAL: No joint pain or swelling; No muscle, back, or extremity pain  PSYCHIATRIC: No depression, mood swings  HEME/LYMPH: No easy bruising, or bleeding gums  ALLERGY AND IMMUNOLOGIC: No hives    Vital Signs Last 24 Hrs  T(C): 39.1 (16 Aug 2017 11:30), Max: 39.9 (15 Aug 2017 22:23)  T(F): 102.4 (16 Aug 2017 11:30), Max: 103.8 (15 Aug 2017 22:23)  HR: 62 (16 Aug 2017 11:10) (50 - 66)  BP: 102/61 (16 Aug 2017 11:10) (102/61 - 159/75)  BP(mean): --  RR: 16 (16 Aug 2017 11:10) (16 - 17)  SpO2: 93% (16 Aug 2017 11:10) (93% - 98%)    PHYSICAL EXAM:  GENERAL: NAD, well-groomed, well-developed  HEAD:  Atraumatic, Normocephalic  EYES: EOMI, PERRLA, conjunctiva and sclera clear  ENMT: No tonsillar erythema, exudates, or enlargement   NECK: Supple, No JVD  NERVOUS SYSTEM:  Alert & Oriented X3, Good concentration  CHEST/LUNG: Clear to auscultation bilaterally; No rales, rhonchi, wheezing  HEART: Regular rate and rhythm  ABDOMEN: Soft, Nontender, Nondistended; Bowel sounds present  EXTREMITIES:  2+ Peripheral Pulses   LYMPH: No lymphadenopathy noted  SKIN: No rashes     LABS:                        13.5   5.1   )-----------( 120      ( 16 Aug 2017 07:52 )             39.9       Ca    8.9        15 Aug 2017 07:44          CAPILLARY BLOOD GLUCOSE                RADIOLOGY & ADDITIONAL TESTS:  < from: US Kidney and Bladder (08.15.17 @ 08:24) >  XAM:  US KIDNEYS AND BLADDER                            PROCEDURE DATE:  08/15/2017          INTERPRETATION:  History: Sepsis, UTI.    Bilateral renal ultrasound.    Right kidney 12.1 left 11.8 cm long dimension. Normal cortical volume   echotexture. No hydronephrosis shadowing calculus solid or cystic mass   bilaterally.  Bladder distended up to 57 cc. Patient did not void.  Incidental gallstones.    Impression: No hydronephrosis.                CRISTOBAL LAM M.D., ATTENDING RADIOLOGIST  This document has been electronically signed. Aug 15 2017  9:19AM              < end of copied text >        Consultant(s) Notes Reviewed:  [x ] YES  [ ] NO    Care Discussed with Consultants/Other Providers [x ] YES  [ ] NO    Advanced Care Planning Discussed with Patien/Family [ ] YES  x[ ] NO

## 2017-08-16 NOTE — PROGRESS NOTE ADULT - SUBJECTIVE AND OBJECTIVE BOX
PAST MEDICAL & SURGICAL HISTORY:  Bladder cancer  HTN (hypertension)  H/O heart artery stent: x2 (1995)  S/P hernia surgery: (2007)      REVIEW OF SYSTEMS:    MEDICATIONS  (STANDING):  aspirin  chewable 81 milliGRAM(s) Oral daily  atorvastatin 20 milliGRAM(s) Oral at bedtime  ATENolol  Tablet 25 milliGRAM(s) Oral daily  cefTRIAXone   IVPB 1 Gram(s) IV Intermittent every 24 hours  enoxaparin Injectable 40 milliGRAM(s) SubCutaneous every 24 hours  sodium chloride 0.9%. 1000 milliLiter(s) (75 mL/Hr) IV Continuous <Continuous>  lactobacillus acidophilus 1 Tablet(s) Oral every 8 hours  lisinopril 10 milliGRAM(s) Oral daily    MEDICATIONS  (PRN):  acetaminophen   Tablet 650 milliGRAM(s) Oral every 6 hours PRN For Temp greater than 38 C (100.4 F)      PE:    Allergies    No Known Allergies    Intolerances        FAMILY HISTORY:  Family history of colon cancer (Sibling)  Family history of heart disease (Father, Mother)  Family history of pancreatic cancer (Father)      Vital Signs Last 24 Hrs  T(C): 39.9 (16 Aug 2017 09:59), Max: 39.9 (15 Aug 2017 22:23)  T(F): 103.8 (16 Aug 2017 09:59), Max: 103.8 (15 Aug 2017 22:23)  HR: 62 (16 Aug 2017 11:10) (50 - 66)  BP: 102/61 (16 Aug 2017 11:10) (102/61 - 159/75)  BP(mean): --  RR: 16 (16 Aug 2017 11:10) (16 - 17)  SpO2: 93% (16 Aug 2017 11:10) (93% - 98%)    PHYSICAL EXAM:    LABS:                        13.5   5.1   )-----------( 120      ( 16 Aug 2017 07:52 )             39.9     08-15    139  |  107  |  20  ----------------------------<  122<H>  4.2   |  23  |  1.20    Ca    8.9      15 Aug 2017 07:44          Urine Culture:     RADIOLOGY & ADDITIONAL STUDIES:

## 2017-08-16 NOTE — PROGRESS NOTE ADULT - SUBJECTIVE AND OBJECTIVE BOX
[facility], Division of Infectious Diseases  REDD Mckeon A. Lee    Name: DESTINEE LAMB  Age: 72y  Gender: Male  MRN: [id number]    Interval History--  Notes reviewed/ Persistent, intermittent fevers with chills, rigors, sweats and periods of confusion. Some dry cough and wheezing per wife. Denies any urinary symptoms. No N/V/D. No back or flank pain. No joint pain. No rash    Patient's wife raised question of Lyme disease. No rash or tick bite, patient on Ceftriaxone without impact.    Patient apparently works part-time as an . Does go into crawl spaces, etc, with mouse/vermin exposure. Again, on ceftriaxone which should cover leptospirosis.    Past Medical History--  Bladder cancer  HTN (hypertension)  H/O heart artery stent  S/P hernia surgery      For details regarding the patient's social history, family history, and other miscellaneous elements, please refer the initial infectious diseases consultation and/or the admitting history and physical examination for this admission.    Allergies    No Known Allergies    Intolerances        Medications--  Antibiotics:  cefTRIAXone   IVPB 1 Gram(s) IV Intermittent every 24 hours    Immunologic:    Other:  aspirin  chewable  atorvastatin  ATENolol  Tablet  enoxaparin Injectable  acetaminophen   Tablet PRN  lactobacillus acidophilus  lisinopril  sodium chloride 0.9%.      Review of Systems--  A 10-point review of systems was obtained.     Pertinent positives and negatives--  Constitutional: +fevers. + Chills. + Rigors.   CardiovasculaSome cough/wheeze.  Gastrointestinal: No nausea or vomiting. No diarrhea or constipation.   Psychiatric: Pleasant. Appropriate affect.    Review of systems otherwise negative except as previously noted.    Physical Examination--  Vital Signs: T(F): 100.9 (08-16-17 @ 13:22), Max: 103.8 (08-15-17 @ 22:23)  HR: 62 (08-16-17 @ 13:22)  BP: 105/67 (08-16-17 @ 13:22)  RR: 16 (08-16-17 @ 13:22)  SpO2: 94% (08-16-17 @ 13:22)  Wt(kg): --  General: Nontoxic-appearing Male in no acute distress. Less confused.  HEENT: AT/NC. PERRL. EOMI. Anicteric. Conjunctiva pink and moist. Oropharynx clear. Dentition fair.  Neck: Not rigid. No sense of mass.  Nodes: None palpable.  Lungs: Clear bilaterally without rales, wheezing or ronchi  Heart: Regular rate and rhythm. No Murmur. No rub. No gallop. No palpable thrill.  Abdomen: Bowel sounds present and normoactive. Soft. Nondistended. Nontender. No sense of mass. No organomegaly.  Back: No spinal tenderness. No costovertebral angle tenderness.   Extremities: No cyanosis or clubbing. No edema.   Skin: Warm. Dry. Good turgor. No rash. No vasculitic stigmata.  Psychiatric: Appropriate affect and mood for situation.     Laboratory Studies--  CBC                        13.5   5.1   )-----------( 120      ( 16 Aug 2017 07:52 )             39.9       Chemistries  08-15    139  |  107  |  20  ----------------------------<  122<H>  4.2   |  23  |  1.20    Ca    8.9      15 Aug 2017 07:44      Culture Data  Culture - Urine (08.13.17 @ 14:13)    -  Cefazolin: S <=2    -  Cefoxitin: S <=4    -  Ertapenem: S <=0.5    -  Gentamicin: S <=1    -  Levofloxacin: S <=1    -  Trimethoprim/Sulfamethoxazole: R >2/38    -  Amikacin: S <=8    -  Ampicillin: R >16    -  Aztreonam: S <=4    -  Piperacillin/Tazobactam: S <=8    -  Tobramycin: S <=2    -  Ampicillin/Sulbactam: I 16/8    -  Ceftazidime: S <=1    -  Imipenem: S <=1    -  Meropenem: S <=1    -  Cefepime: S <=2    -  Ceftriaxone: S <=1    -  Ciprofloxacin: S <=0.5    -  Nitrofurantoin: S <=32    Specimen Source: .Urine Catheterized    Culture Results:   >100,000 CFU/ml Escherichia coli    Organism Identification: Escherichia coli    Organism: Escherichia coli    Method Type: RAMÓN    Culture - Blood (08.13.17 @ 14:10)    Specimen Source: .Blood Blood-Peripheral    Culture Results:   No growth to date.    Culture - Blood (08.13.17 @ 14:10)    Specimen Source: .Blood Blood-Peripheral    Culture Results:   No growth to date.

## 2017-08-17 DIAGNOSIS — R91.1 SOLITARY PULMONARY NODULE: ICD-10-CM

## 2017-08-17 DIAGNOSIS — N30.00 ACUTE CYSTITIS WITHOUT HEMATURIA: ICD-10-CM

## 2017-08-17 LAB
ALBUMIN SERPL ELPH-MCNC: 2.3 G/DL — LOW (ref 3.3–5)
ALP SERPL-CCNC: 116 U/L — SIGNIFICANT CHANGE UP (ref 40–120)
ALT FLD-CCNC: 99 U/L — HIGH (ref 12–78)
AMMONIA BLD-MCNC: 39 UMOL/L — HIGH (ref 11–32)
ANION GAP SERPL CALC-SCNC: 5 MMOL/L — SIGNIFICANT CHANGE UP (ref 5–17)
ANION GAP SERPL CALC-SCNC: 9 MMOL/L — SIGNIFICANT CHANGE UP (ref 5–17)
AST SERPL-CCNC: 80 U/L — HIGH (ref 15–37)
BASE EXCESS BLDA CALC-SCNC: 1.7 MMOL/L — SIGNIFICANT CHANGE UP (ref -2–2)
BILIRUB SERPL-MCNC: 0.5 MG/DL — SIGNIFICANT CHANGE UP (ref 0.2–1.2)
BUN SERPL-MCNC: 10 MG/DL — SIGNIFICANT CHANGE UP (ref 7–23)
BUN SERPL-MCNC: 10 MG/DL — SIGNIFICANT CHANGE UP (ref 7–23)
CALCIUM SERPL-MCNC: 8.8 MG/DL — SIGNIFICANT CHANGE UP (ref 8.5–10.1)
CALCIUM SERPL-MCNC: 8.9 MG/DL — SIGNIFICANT CHANGE UP (ref 8.5–10.1)
CHLORIDE SERPL-SCNC: 103 MMOL/L — SIGNIFICANT CHANGE UP (ref 96–108)
CHLORIDE SERPL-SCNC: 105 MMOL/L — SIGNIFICANT CHANGE UP (ref 96–108)
CO2 SERPL-SCNC: 25 MMOL/L — SIGNIFICANT CHANGE UP (ref 22–31)
CO2 SERPL-SCNC: 28 MMOL/L — SIGNIFICANT CHANGE UP (ref 22–31)
CREAT SERPL-MCNC: 0.92 MG/DL — SIGNIFICANT CHANGE UP (ref 0.5–1.3)
CREAT SERPL-MCNC: 1 MG/DL — SIGNIFICANT CHANGE UP (ref 0.5–1.3)
CULTURE RESULTS: NO GROWTH — SIGNIFICANT CHANGE UP
EOSINOPHIL NFR BLD AUTO: 1 % — SIGNIFICANT CHANGE UP (ref 0–6)
GLUCOSE SERPL-MCNC: 117 MG/DL — HIGH (ref 70–99)
GLUCOSE SERPL-MCNC: 127 MG/DL — HIGH (ref 70–99)
HCO3 BLDA-SCNC: 26 MMOL/L — SIGNIFICANT CHANGE UP (ref 23–27)
HCT VFR BLD CALC: 34.8 % — LOW (ref 39–50)
HCT VFR BLD CALC: 35 % — LOW (ref 39–50)
HGB BLD-MCNC: 11.9 G/DL — LOW (ref 13–17)
HGB BLD-MCNC: 12.1 G/DL — LOW (ref 13–17)
HOROWITZ INDEX BLDA+IHG-RTO: 21 — SIGNIFICANT CHANGE UP
LACTATE SERPL-SCNC: 1.1 MMOL/L — SIGNIFICANT CHANGE UP (ref 0.7–2)
LYMPHOCYTES # BLD AUTO: 27 % — SIGNIFICANT CHANGE UP (ref 13–44)
MCHC RBC-ENTMCNC: 32.2 PG — SIGNIFICANT CHANGE UP (ref 27–34)
MCHC RBC-ENTMCNC: 32.3 PG — SIGNIFICANT CHANGE UP (ref 27–34)
MCHC RBC-ENTMCNC: 34.1 GM/DL — SIGNIFICANT CHANGE UP (ref 32–36)
MCHC RBC-ENTMCNC: 34.7 GM/DL — SIGNIFICANT CHANGE UP (ref 32–36)
MCV RBC AUTO: 93.3 FL — SIGNIFICANT CHANGE UP (ref 80–100)
MCV RBC AUTO: 94.5 FL — SIGNIFICANT CHANGE UP (ref 80–100)
MONOCYTES NFR BLD AUTO: 9 % — SIGNIFICANT CHANGE UP (ref 1–9)
NEUTROPHILS NFR BLD AUTO: 58 % — SIGNIFICANT CHANGE UP (ref 43–77)
PCO2 BLDA: 32 MMHG — SIGNIFICANT CHANGE UP (ref 32–46)
PH BLDA: 7.49 — HIGH (ref 7.35–7.45)
PLATELET # BLD AUTO: 124 K/UL — LOW (ref 150–400)
PLATELET # BLD AUTO: 138 K/UL — LOW (ref 150–400)
PO2 BLDA: 86 MMHG — SIGNIFICANT CHANGE UP (ref 74–108)
POTASSIUM SERPL-MCNC: 3.6 MMOL/L — SIGNIFICANT CHANGE UP (ref 3.5–5.3)
POTASSIUM SERPL-MCNC: 3.8 MMOL/L — SIGNIFICANT CHANGE UP (ref 3.5–5.3)
POTASSIUM SERPL-SCNC: 3.6 MMOL/L — SIGNIFICANT CHANGE UP (ref 3.5–5.3)
POTASSIUM SERPL-SCNC: 3.8 MMOL/L — SIGNIFICANT CHANGE UP (ref 3.5–5.3)
PROT SERPL-MCNC: 6.4 G/DL — SIGNIFICANT CHANGE UP (ref 6–8.3)
RBC # BLD: 3.71 M/UL — LOW (ref 4.2–5.8)
RBC # BLD: 3.73 M/UL — LOW (ref 4.2–5.8)
RBC # FLD: 11.9 % — SIGNIFICANT CHANGE UP (ref 10.3–14.5)
RBC # FLD: 12 % — SIGNIFICANT CHANGE UP (ref 10.3–14.5)
SAO2 % BLDA: 97 % — HIGH (ref 92–96)
SODIUM SERPL-SCNC: 137 MMOL/L — SIGNIFICANT CHANGE UP (ref 135–145)
SODIUM SERPL-SCNC: 138 MMOL/L — SIGNIFICANT CHANGE UP (ref 135–145)
SPECIMEN SOURCE: SIGNIFICANT CHANGE UP
VANCOMYCIN TROUGH SERPL-MCNC: 8.1 UG/ML — LOW (ref 10–20)
WBC # BLD: 6 K/UL — SIGNIFICANT CHANGE UP (ref 3.8–10.5)
WBC # BLD: 6.4 K/UL — SIGNIFICANT CHANGE UP (ref 3.8–10.5)
WBC # FLD AUTO: 6 K/UL — SIGNIFICANT CHANGE UP (ref 3.8–10.5)
WBC # FLD AUTO: 6.4 K/UL — SIGNIFICANT CHANGE UP (ref 3.8–10.5)

## 2017-08-17 PROCEDURE — 70450 CT HEAD/BRAIN W/O DYE: CPT | Mod: 26

## 2017-08-17 PROCEDURE — 99223 1ST HOSP IP/OBS HIGH 75: CPT

## 2017-08-17 PROCEDURE — 78226 HEPATOBILIARY SYSTEM IMAGING: CPT | Mod: 26

## 2017-08-17 PROCEDURE — 71010: CPT | Mod: 26

## 2017-08-17 RX ORDER — FUROSEMIDE 40 MG
40 TABLET ORAL ONCE
Qty: 0 | Refills: 0 | Status: COMPLETED | OUTPATIENT
Start: 2017-08-17 | End: 2017-08-17

## 2017-08-17 RX ORDER — IPRATROPIUM/ALBUTEROL SULFATE 18-103MCG
3 AEROSOL WITH ADAPTER (GRAM) INHALATION ONCE
Qty: 0 | Refills: 0 | Status: COMPLETED | OUTPATIENT
Start: 2017-08-17 | End: 2017-08-17

## 2017-08-17 RX ORDER — POTASSIUM CHLORIDE 20 MEQ
20 PACKET (EA) ORAL
Qty: 0 | Refills: 0 | Status: COMPLETED | OUTPATIENT
Start: 2017-08-17 | End: 2017-08-17

## 2017-08-17 RX ADMIN — Medication 1 TABLET(S): at 05:46

## 2017-08-17 RX ADMIN — Medication 650 MILLIGRAM(S): at 23:02

## 2017-08-17 RX ADMIN — Medication 81 MILLIGRAM(S): at 11:45

## 2017-08-17 RX ADMIN — PIPERACILLIN AND TAZOBACTAM 25 GRAM(S): 4; .5 INJECTION, POWDER, LYOPHILIZED, FOR SOLUTION INTRAVENOUS at 13:42

## 2017-08-17 RX ADMIN — Medication 40 MILLIGRAM(S): at 12:48

## 2017-08-17 RX ADMIN — Medication 3 MILLILITER(S): at 04:27

## 2017-08-17 RX ADMIN — ATORVASTATIN CALCIUM 20 MILLIGRAM(S): 80 TABLET, FILM COATED ORAL at 21:02

## 2017-08-17 RX ADMIN — Medication 166.67 MILLIGRAM(S): at 03:29

## 2017-08-17 RX ADMIN — Medication 650 MILLIGRAM(S): at 15:43

## 2017-08-17 RX ADMIN — Medication 20 MILLIEQUIVALENT(S): at 13:41

## 2017-08-17 RX ADMIN — LISINOPRIL 10 MILLIGRAM(S): 2.5 TABLET ORAL at 05:46

## 2017-08-17 RX ADMIN — Medication 650 MILLIGRAM(S): at 04:52

## 2017-08-17 RX ADMIN — Medication 166.67 MILLIGRAM(S): at 15:43

## 2017-08-17 RX ADMIN — Medication 20 MILLIEQUIVALENT(S): at 15:43

## 2017-08-17 RX ADMIN — ENOXAPARIN SODIUM 40 MILLIGRAM(S): 100 INJECTION SUBCUTANEOUS at 11:44

## 2017-08-17 RX ADMIN — PIPERACILLIN AND TAZOBACTAM 25 GRAM(S): 4; .5 INJECTION, POWDER, LYOPHILIZED, FOR SOLUTION INTRAVENOUS at 21:02

## 2017-08-17 RX ADMIN — Medication 1 TABLET(S): at 21:18

## 2017-08-17 RX ADMIN — ATENOLOL 25 MILLIGRAM(S): 25 TABLET ORAL at 05:46

## 2017-08-17 RX ADMIN — Medication 1 TABLET(S): at 13:42

## 2017-08-17 RX ADMIN — PIPERACILLIN AND TAZOBACTAM 25 GRAM(S): 4; .5 INJECTION, POWDER, LYOPHILIZED, FOR SOLUTION INTRAVENOUS at 05:46

## 2017-08-17 NOTE — CONSULT NOTE ADULT - SUBJECTIVE AND OBJECTIVE BOX
[facility]    Cheryl Yanes, Luis, Aidan, Xiomara, Kolby, Jamesyeimy    [phone]    CHIEF COMPLAINT: Patient is a 72y old  Male who presents with a chief complaint of fevers/chills (13 Aug 2017 12:40)    HPI: 72 year old male with PMHx of HTN, CAD (s/p 2 stents, 1995), Bladder CA (in remission, dx 2013, s/p TURBT and serial surveillance cystoscopies) who presented to the ED with 3 days of fever and chills after a routine screening cystoscopy performed 8/8.  Pt states he did receive 1 dose of antibiotics at the time of cystoscopy but does not recall the name.  Pt stated that the he first felt chills on 8/10 but felt well enough to play baseball on Friday.  However, on evening of 8/11 he began experiencing fever (per pt's wife Tmax 103F), chills, and drenching sweats.  He was given Advil/Tylenol by his wife for the fever.  Pt also endorses 2 episodes of non-bloody emesis prior to presentation.  He noted decreased oral intake since 8/11 due to nausea/vomiting and has not be drinking a significant amount of fluid.  Patient denied dysuria, frequency, incontinence, or urgency, but stated that  it "feels different" when he urinates and that his urine is "orange." He admits to dizziness when walking.      During this admission, the patient was diagnosed with cholelithiasis. CT abd& chest shows no evidence of PNA, indeterminate FELICITY parenchyma nodule lobe. Cholelithiasis & PCF. Nonspecific perirenal and perivesicular stranding consistent with UTI.     PAST MEDICAL HISTORY  HTN  CAD s/p stents  Bladder CA (in remission, dx 2013, s/p TURBT and serial surveillance cystoscopies)    PAST SURGICAL HISTORY   coronary stent x2 (1995)  s/p hernia surgery (2007)    MEDICATIONS  ASA 81mg PO qd  Atenolol 25mg PO qd  Atorvastatin 20mg PO qd  Ramipril 5mg PO qd  Vitamin D3 1000 units     ALLERGIES  NKDA    FAMILY HISTORY  Father- Pancreatic cancer, hx of heart disease  Mother-hx of heart disease  Sibling- colon cancer     SOCIAL HISTORY  Former smoker (27 years, 2-3ppd)  Admits to alcohol intake ~1/month  Denies recreational drug use.   Ambulates without any assistive devices.   Currently works as , owns [email or web address]      MEDICATIONS  (STANDING):  aspirin  chewable 81 milliGRAM(s) Oral daily  atorvastatin 20 milliGRAM(s) Oral at bedtime  ATENolol  Tablet 25 milliGRAM(s) Oral daily  enoxaparin Injectable 40 milliGRAM(s) SubCutaneous every 24 hours  lactobacillus acidophilus 1 Tablet(s) Oral every 8 hours  lisinopril 10 milliGRAM(s) Oral daily  piperacillin/tazobactam IVPB.      vancomycin  IVPB   IV Intermittent   piperacillin/tazobactam IVPB. 3.375 Gram(s) IV Intermittent every 8 hours  vancomycin  IVPB 1250 milliGRAM(s) IV Intermittent every 12 hours  potassium chloride    Tablet ER 20 milliEquivalent(s) Oral every 2 hours    MEDICATIONS  (PRN):  acetaminophen   Tablet 650 milliGRAM(s) Oral every 6 hours PRN For Temp greater than 38 C (100.4 F)      REVIEW OF SYSTEMS:  eye, ent, GI, , allergic, dermatologic, musculoskeletal and neurologic are negative except as described above    Vital Signs Last 24 Hrs  T(C): 37.7 (17 Aug 2017 13:08), Max: 38.9 (16 Aug 2017 21:34)  T(F): 99.8 (17 Aug 2017 13:08), Max: 102 (16 Aug 2017 21:34)  HR: 57 (17 Aug 2017 12:43) (57 - 67)  BP: 157/79 (17 Aug 2017 12:43) (119/69 - 157/79)  BP(mean): --  RR: 20 (17 Aug 2017 13:08) (16 - 20)  SpO2: 99% (17 Aug 2017 12:43) (87% - 99%)    I&O's Summary    16 Aug 2017 07:01  -  17 Aug 2017 07:00  --------------------------------------------------------  IN: 3405 mL / OUT: 450 mL / NET: 2955 mL    17 Aug 2017 07:01  -  17 Aug 2017 15:03  --------------------------------------------------------  IN: 240 mL / OUT: 0 mL / NET: 240 mL      PHYSICAL EXAM:    Constitutional: well-nourished, well-developed, NAD   HEENT:  MMM, sclerae anicteric, conjunctivae clear, no oral cyanosis.  Pulmonary: Non-labored, breath sounds are clear bilaterally, No wheezing, rales or rhonchi  Cardiovascular: Regular, S1 and S2, No murmurs, rubs, gallops or clicks  Gastrointestinal: Bowel Sounds present, soft, nontender.   Lymph: No peripheral edema. No lymphadenopathy.  Neurological: Alert, no focal deficits  Skin: No rashes.  Psych:  Mood & affect appropriate    LABS: All Labs Reviewed:                        11.9   6.0   )-----------( 124      ( 17 Aug 2017 08:06 )             35.0                         13.5   5.1   )-----------( 120      ( 16 Aug 2017 07:52 )             39.9                         12.3   6.8   )-----------( 112      ( 15 Aug 2017 07:44 )             36.7     17 Aug 2017 08:06    138    |  105    |  10     ----------------------------<  117    3.8     |  28     |  1.00   15 Aug 2017 07:44    139    |  107    |  20     ----------------------------<  122    4.2     |  23     |  1.20     Ca    8.8        17 Aug 2017 08:06  Ca    8.9        15 Aug 2017 07:44    TPro  5.9    /  Alb  2.2    /  TBili  0.3    /  DBili  .20    /  AST  82     /  ALT  97     /  AlkPhos  102    17 Aug 2017 08:06          Blood Culture: Negative x2  Urine cx: Positive >100k E.coli         RADIOLOGY:    NM HEPATOBILIARY IMG                        PROCEDURE DATE:  08/17/2017    INTERPRETATION:  RADIOPHARMACEUTICAL: 3.0 mCi Tc-99m-Mebrofenin, I.V.  CLINICAL STATEMENT: 72-year-old male with fever. Patient has   cholelithiasis and pericholecystic fluid on recent CT.  TECHNIQUE:  Dynamic imaging of the anterior abdomen was performed for 25   minutes following injection of radiotracer. Static images of the abdomen   in the anterior, right lateral, and right anterior oblique views were   obtained immediately thereafter.  FINDINGS: There is prompt, homogeneous uptake of radiotracer by the   hepatocytes. Activity is first seen in the gallbladder at 10 minutes and   in the bowel at 5 minutes. There is good clearance of activity from the   liver by the end of the study.  IMPRESSION: Normal hepatobiliary scan.  No evidence of acute cholecystitis.       PORTABLE CHEST URGENT                        PROCEDURE DATE:  08/17/2017    INTERPRETATION:  Clinical information: Congestive heart failure.  Technique: Portable AP chest film.  Comparison: Prior chest, abdomen, and pelvis CT examination from   8/16/2017.  Findings: The lungs are grossly clear. The previously noted pulmonary   nodule within the left upper lobe is not clearly seen on this x-ray   examination. The heart size is enlarged. There are mild multilevel   degenerative changes of the thoracic spine.    IMPRESSION: No acute findings.      CT CHEST IC                        PROCEDURE DATE:  08/16/2017    INTERPRETATION:  History: Fever status post cystoscopy, wheezing.    Multislice double contrast CT chest abdomen and pelvis.  95 cc Omnipaque 350 injected intravenously.  Fibroatelectatic stranding left base. No evidence of pneumonia. There is   an indeterminate noncalcified 1 cm left upper lobe parenchymal nodule (2,   27). Correlate with pet imaging. Scattered subcentimeter and borderline   enlarged mediastinal lymph nodes. Patent central airways.  Trace basilar effusions.  Mild cardiomegaly with coronary artery calcification.  Calcified gallstone. Pericholecystic fluid is nonspecific finding in and   itself may be related to liver disease hypoalbuminemia or patient's fluid   status. If there is clinical suspicion for cholecystitis right upper   quadrant ultrasound and/or HIDA scan can be obtained. No biliary   dilatation. Pancreas spleen adrenals not remarkable.  Subcentimeter right renal cyst. Bilateral perirenal stranding is   nonspecific can be seen in the setting of UTI. No hydronephrosis   nonaneurysmal abdominal aorta.  No evidence of appendicitis inflamed or obstructed bowel. Trace pelvic   ascites. No free air.  Slightly prominent prostate. Perivesical fat stranding suggests cystitis   in the appropriate clinical setting. Correlate with symptomatology and   urinalysis.  Subcutaneous gas droplets lower abdomen attributed to injection sites.  Mild dependent subcutaneous edema lower lumbar region  No acute or aggressive osseous pathology.    Impression:    No evidence of pneumonia or other acute intrathoracic pathology.  Indeterminate left upper lobe parenchymal nodule lobe. Correlate with pet   imaging.  Cholelithiasis and pericholecystic fluid. Correlate with right upper   quadrant ultrasound and/or HIDA scan.  Nonspecific perirenal and perivesical stranding may reflect UTI in the   appropriate clinical setting. Correlate with symptomatology and   urinalysis.  Additional findings as discussed.      CHEST PA & LAT                        PROCEDURE DATE:  08/16/2017    INTERPRETATION:  Chest PA and lateral 2 views:  Clinical history:  Fever, wheezing, cough.  Findings:  Mild cardiomegaly. Tortuous calcified thoracic aorta. Mild pulmonary   venous congestion. Bibasilar atelectasis. 1 cm, soft tissue densities,   Lung nodules seen only in the lateral view. CP angles appear normal.    Impression:  Pulmonary venous congestion.  Bibasilar focal atelectasis.  Lung nodule seen on the lateral view.    If clinically indicated, if clinically warranted, further evaluation by    CT scan of the chest recommended.     US KIDNEYS AND BLADDER                        PROCEDURE DATE:  08/15/2017    INTERPRETATION:  History: Sepsis, UTI.  Bilateral renal ultrasound.  Right kidney 12.1 left 11.8 cm long dimension. Normal cortical volume   echotexture. No hydronephrosis shadowing calculus solid or cystic mass   bilaterally.  Bladder distended up to 57 cc. Patient did not void.  Incidental gallstones.    Impression: No hydronephrosis.      EKG:  Ventricular Rate 59 BPM    Atrial Rate 59 BPM    P-R Interval 156 ms    QRS Duration 86 ms    Q-T Interval 398 ms    QTC Calculation(Bezet) 394 ms    P Axis 40 degrees    R Axis 22 degrees    T Axis -20 degrees    Diagnosis Line Sinus bradycardia  T wave abnormality, consider inferior ischemia [facility]    Cheryl Yanes, Luis, Aidan, Xiomara, Kolby, Jamesyeimy    [phone]    CHIEF COMPLAINT: Patient is a 72y old  Male who presents with a chief complaint of fevers/chills (13 Aug 2017 12:40)    HPI: 72 year old male with PMHx of HTN, MI (1995), CAD (s/p 2 stents, 1995), Bladder CA (in remission, dx 2013, s/p TURBT and serial surveillance cystoscopies) who presented to the ED with 3 days of fever and chills after a routine screening cystoscopy performed 8/8.  Pt states he did receive 1 dose of antibiotics at the time of cystoscopy but does not recall the name.  Pt stated that the he first felt chills on 8/10 but felt well enough to play baseball on Friday.  However, on evening of 8/11 he began experiencing fever (per pt's wife Tmax 103F), chills, and drenching sweats.  He was given Advil/Tylenol by his wife for the fever.  Pt also endorses 2 episodes of non-bloody emesis prior to presentation.  He noted decreased oral intake since 8/11 due to nausea/vomiting and has not be drinking a significant amount of fluid.  Patient denied dysuria, frequency, incontinence, or urgency, but stated that  it "feels different" when he urinates and that his urine is "orange." He admits to dizziness when walking.      During this admission, the patient was diagnosed with cholelithiasis. CT abd& chest shows no evidence of PNA, indeterminate FELICITY parenchyma nodule lobe. Cholelithiasis & PCF. Nonspecific perirenal and perivesicular stranding consistent with UTI.     Patient was seen and examined today lying in bed in [facility] after completing HIDA scan. Patient appeared alert, awake and oriented x3 initially but during questioning, his response to questions were being corrected by pt's wife sitting at bedside. Patient is admitting to frequent urinary but no burning or itching. Patient is currently denying sob, chest pain, palpitations, abdominal pain.     PAST MEDICAL HISTORY  HTN  CAD s/p 2 stents  MI (1995)  Bladder CA (in remission, dx 2013, s/p TURBT and serial surveillance cystoscopies)    PAST SURGICAL HISTORY   coronary stent x2 (1995)  s/p hernia surgery (2007)    MEDICATIONS  ASA 81mg PO qd  Atenolol 25mg PO qd  Atorvastatin 20mg PO qd  Ramipril 5mg PO qd  Vitamin D3 1000 units     ALLERGIES  NKDA    FAMILY HISTORY  Father- Pancreatic cancer, hx of heart disease  Mother-hx of heart disease  Sibling- colon cancer     SOCIAL HISTORY  Former smoker (27 years, 2-3ppd)  Admits to alcohol intake ~1/month  Denies recreational drug use.   Ambulates without any assistive devices.   Currently works as , owns [employer]      MEDICATIONS  (STANDING):  aspirin  chewable 81 milliGRAM(s) Oral daily  atorvastatin 20 milliGRAM(s) Oral at bedtime  ATENolol  Tablet 25 milliGRAM(s) Oral daily  enoxaparin Injectable 40 milliGRAM(s) SubCutaneous every 24 hours  lactobacillus acidophilus 1 Tablet(s) Oral every 8 hours  lisinopril 10 milliGRAM(s) Oral daily  piperacillin/tazobactam IVPB.      vancomycin  IVPB   IV Intermittent   piperacillin/tazobactam IVPB. 3.375 Gram(s) IV Intermittent every 8 hours  vancomycin  IVPB 1250 milliGRAM(s) IV Intermittent every 12 hours  potassium chloride    Tablet ER 20 milliEquivalent(s) Oral every 2 hours    MEDICATIONS  (PRN):  acetaminophen   Tablet 650 milliGRAM(s) Oral every 6 hours PRN For Temp greater than 38 C (100.4 F)      REVIEW OF SYSTEMS:  eye, ent, GI, , allergic, dermatologic, musculoskeletal and neurologic are negative except as described above    Vital Signs Last 24 Hrs  T(C): 37.7 (17 Aug 2017 13:08), Max: 38.9 (16 Aug 2017 21:34)  T(F): 99.8 (17 Aug 2017 13:08), Max: 102 (16 Aug 2017 21:34)  HR: 57 (17 Aug 2017 12:43) (57 - 67)  BP: 157/79 (17 Aug 2017 12:43) (119/69 - 157/79)  BP(mean): --  RR: 20 (17 Aug 2017 13:08) (16 - 20)  SpO2: 99% (17 Aug 2017 12:43) (87% - 99%)    I&O's Summary    16 Aug 2017 07:01  -  17 Aug 2017 07:00  --------------------------------------------------------  IN: 3405 mL / OUT: 450 mL / NET: 2955 mL    17 Aug 2017 07:01  -  17 Aug 2017 15:03  --------------------------------------------------------  IN: 240 mL / OUT: 0 mL / NET: 240 mL      PHYSICAL EXAM:    Constitutional: Elderly  male lying comfortably in bed, well-nourished, well-developed, NAD   HEENT:  NC/AT, MMM, sclerae anicteric, conjunctivae clear, no oral cyanosis.  Pulmonary: +wheezing in b/l upper and lower lobes, no rales or rhonchi  Cardiovascular: Regular, S1 and S2, No murmurs, rubs, gallops or clicks  Gastrointestinal: Bowel Sounds present, soft, nontender.   Lymph: No peripheral edema. No lymphadenopathy.  Neurological: Alert, no focal deficits  Skin: No rashes.  Psych:  Mood & affect appropriate    LABS: All Labs Reviewed:                        11.9   6.0   )-----------( 124      ( 17 Aug 2017 08:06 )             35.0                         13.5   5.1   )-----------( 120      ( 16 Aug 2017 07:52 )             39.9                         12.3   6.8   )-----------( 112      ( 15 Aug 2017 07:44 )             36.7     17 Aug 2017 08:06    138    |  105    |  10     ----------------------------<  117    3.8     |  28     |  1.00   15 Aug 2017 07:44    139    |  107    |  20     ----------------------------<  122    4.2     |  23     |  1.20     Ca    8.8        17 Aug 2017 08:06  Ca    8.9        15 Aug 2017 07:44    TPro  5.9    /  Alb  2.2    /  TBili  0.3    /  DBili  .20    /  AST  82     /  ALT  97     /  AlkPhos  102    17 Aug 2017 08:06          Blood Culture: Negative x2  Urine cx: Positive >100k E.coli         RADIOLOGY:    NM HEPATOBILIARY IMG                        PROCEDURE DATE:  08/17/2017    INTERPRETATION:  RADIOPHARMACEUTICAL: 3.0 mCi Tc-99m-Mebrofenin, I.V.  CLINICAL STATEMENT: 72-year-old male with fever. Patient has   cholelithiasis and pericholecystic fluid on recent CT.  TECHNIQUE:  Dynamic imaging of the anterior abdomen was performed for 25   minutes following injection of radiotracer. Static images of the abdomen   in the anterior, right lateral, and right anterior oblique views were   obtained immediately thereafter.  FINDINGS: There is prompt, homogeneous uptake of radiotracer by the   hepatocytes. Activity is first seen in the gallbladder at 10 minutes and   in the bowel at 5 minutes. There is good clearance of activity from the   liver by the end of the study.  IMPRESSION: Normal hepatobiliary scan.  No evidence of acute cholecystitis.       PORTABLE CHEST URGENT                        PROCEDURE DATE:  08/17/2017    INTERPRETATION:  Clinical information: Congestive heart failure.  Technique: Portable AP chest film.  Comparison: Prior chest, abdomen, and pelvis CT examination from   8/16/2017.  Findings: The lungs are grossly clear. The previously noted pulmonary   nodule within the left upper lobe is not clearly seen on this x-ray   examination. The heart size is enlarged. There are mild multilevel   degenerative changes of the thoracic spine.    IMPRESSION: No acute findings.      CT CHEST IC                        PROCEDURE DATE:  08/16/2017    INTERPRETATION:  History: Fever status post cystoscopy, wheezing.    Multislice double contrast CT chest abdomen and pelvis.  95 cc Omnipaque 350 injected intravenously.  Fibroatelectatic stranding left base. No evidence of pneumonia. There is   an indeterminate noncalcified 1 cm left upper lobe parenchymal nodule (2,   27). Correlate with pet imaging. Scattered subcentimeter and borderline   enlarged mediastinal lymph nodes. Patent central airways.  Trace basilar effusions.  Mild cardiomegaly with coronary artery calcification.  Calcified gallstone. Pericholecystic fluid is nonspecific finding in and   itself may be related to liver disease hypoalbuminemia or patient's fluid   status. If there is clinical suspicion for cholecystitis right upper   quadrant ultrasound and/or HIDA scan can be obtained. No biliary   dilatation. Pancreas spleen adrenals not remarkable.  Subcentimeter right renal cyst. Bilateral perirenal stranding is   nonspecific can be seen in the setting of UTI. No hydronephrosis   nonaneurysmal abdominal aorta.  No evidence of appendicitis inflamed or obstructed bowel. Trace pelvic   ascites. No free air.  Slightly prominent prostate. Perivesical fat stranding suggests cystitis   in the appropriate clinical setting. Correlate with symptomatology and   urinalysis.  Subcutaneous gas droplets lower abdomen attributed to injection sites.  Mild dependent subcutaneous edema lower lumbar region  No acute or aggressive osseous pathology.    Impression:    No evidence of pneumonia or other acute intrathoracic pathology.  Indeterminate left upper lobe parenchymal nodule lobe. Correlate with pet   imaging.  Cholelithiasis and pericholecystic fluid. Correlate with right upper   quadrant ultrasound and/or HIDA scan.  Nonspecific perirenal and perivesical stranding may reflect UTI in the   appropriate clinical setting. Correlate with symptomatology and   urinalysis.  Additional findings as discussed.      CHEST PA & LAT                        PROCEDURE DATE:  08/16/2017    INTERPRETATION:  Chest PA and lateral 2 views:  Clinical history:  Fever, wheezing, cough.  Findings:  Mild cardiomegaly. Tortuous calcified thoracic aorta. Mild pulmonary   venous congestion. Bibasilar atelectasis. 1 cm, soft tissue densities,   Lung nodules seen only in the lateral view. CP angles appear normal.    Impression:  Pulmonary venous congestion.  Bibasilar focal atelectasis.  Lung nodule seen on the lateral view.    If clinically indicated, if clinically warranted, further evaluation by    CT scan of the chest recommended.     US KIDNEYS AND BLADDER                        PROCEDURE DATE:  08/15/2017    INTERPRETATION:  History: Sepsis, UTI.  Bilateral renal ultrasound.  Right kidney 12.1 left 11.8 cm long dimension. Normal cortical volume   echotexture. No hydronephrosis shadowing calculus solid or cystic mass   bilaterally.  Bladder distended up to 57 cc. Patient did not void.  Incidental gallstones.    Impression: No hydronephrosis.      EKG:  Ventricular Rate 59 BPM    Atrial Rate 59 BPM    P-R Interval 156 ms    QRS Duration 86 ms    Q-T Interval 398 ms    QTC Calculation(Bezet) 394 ms    P Axis 40 degrees    R Axis 22 degrees    T Axis -20 degrees    Diagnosis Line Sinus bradycardia  T wave abnormality, consider inferior ischemia [facility]    Cheryl Yanes, Luis, Aidan, Xiomara, Kolby, Jamesyeimy    [phone]    CHIEF COMPLAINT: Patient is a 72y old  Male who presents with a chief complaint of fevers/chills (13 Aug 2017 12:40)    HPI: 72 year old male with PMHx of HTN, MI (1995), CAD (s/p 2 stents, 1995), normal lvef based on echo as his cardiologists office in February, bladder CA (in remission, dx 2013, s/p TURBT and serial surveillance cystoscopies) who presented to the ED with 3 days of fever and chills after a routine screening cystoscopy performed 8/8.  He was admitted and managed for an acute infectious illness, which at this point still does not have a well defined source.  His management has included multiple boluses of ivf as well as continuous ivf administration.  Last night he developed dyspnea, wheezing and hypoxia.  He was given lasix.  CT scan of the chest revealed trace bibasilar effusions.  Other findings were suspicious for cholecystitis, prompting a hida scan which was negative.  He has remained with a degree of dyspnea.  He has had a waxing and waning mental status.  Given his dyspnea, suspicous for iatrogenic hf, consultation is being obtained.      PAST MEDICAL HISTORY  HTN  CAD s/p 2 stents  MI (1995)  Bladder CA (in remission, dx 2013, s/p TURBT and serial surveillance cystoscopies)    PAST SURGICAL HISTORY   coronary stent x2 (1995)  s/p hernia surgery (2007)    MEDICATIONS  ASA 81mg PO qd  Atenolol 25mg PO qd  Atorvastatin 20mg PO qd  Ramipril 5mg PO qd  Vitamin D3 1000 units     ALLERGIES  NKDA    FAMILY HISTORY  Father- Pancreatic cancer, hx of heart disease  Mother-hx of heart disease  Sibling- colon cancer     SOCIAL HISTORY  Former smoker (27 years, 2-3ppd)  Admits to alcohol intake ~1/month  Denies recreational drug use.   Ambulates without any assistive devices.   Currently works as , owns [employer]      MEDICATIONS  (STANDING):  aspirin  chewable 81 milliGRAM(s) Oral daily  atorvastatin 20 milliGRAM(s) Oral at bedtime  ATENolol  Tablet 25 milliGRAM(s) Oral daily  enoxaparin Injectable 40 milliGRAM(s) SubCutaneous every 24 hours  lactobacillus acidophilus 1 Tablet(s) Oral every 8 hours  lisinopril 10 milliGRAM(s) Oral daily  piperacillin/tazobactam IVPB.      vancomycin  IVPB   IV Intermittent   piperacillin/tazobactam IVPB. 3.375 Gram(s) IV Intermittent every 8 hours  vancomycin  IVPB 1250 milliGRAM(s) IV Intermittent every 12 hours  potassium chloride    Tablet ER 20 milliEquivalent(s) Oral every 2 hours    MEDICATIONS  (PRN):  acetaminophen   Tablet 650 milliGRAM(s) Oral every 6 hours PRN For Temp greater than 38 C (100.4 F)      REVIEW OF SYSTEMS:  eye, ent, GI, , allergic, dermatologic, musculoskeletal and neurologic are negative except as described above    Vital Signs Last 24 Hrs  T(C): 37.7 (17 Aug 2017 13:08), Max: 38.9 (16 Aug 2017 21:34)  T(F): 99.8 (17 Aug 2017 13:08), Max: 102 (16 Aug 2017 21:34)  HR: 57 (17 Aug 2017 12:43) (57 - 67)  BP: 157/79 (17 Aug 2017 12:43) (119/69 - 157/79)  BP(mean): --  RR: 20 (17 Aug 2017 13:08) (16 - 20)  SpO2: 99% (17 Aug 2017 12:43) (87% - 99%)    I&O's Summary    16 Aug 2017 07:01  -  17 Aug 2017 07:00  --------------------------------------------------------  IN: 3405 mL / OUT: 450 mL / NET: 2955 mL    17 Aug 2017 07:01  -  17 Aug 2017 15:03  --------------------------------------------------------  IN: 240 mL / OUT: 0 mL / NET: 240 mL      PHYSICAL EXAM:    Constitutional: Elderly  male lying comfortably in bed, well-nourished, well-developed, NAD   HEENT:  NC/AT, MMM, sclerae anicteric, conjunctivae clear, no oral cyanosis.  Pulmonary: +wheezing in b/l upper and lower lobes, no rales or rhonchi  Cardiovascular: Regular, S1 and S2, No murmurs, rubs, gallops or clicks  Gastrointestinal: Bowel Sounds present, soft, nontender.   Lymph: No peripheral edema. No lymphadenopathy.  Neurological: Alert, no focal deficits  Skin: No rashes.  Psych:  Mood & affect appropriate    LABS: All Labs Reviewed:                        11.9   6.0   )-----------( 124      ( 17 Aug 2017 08:06 )             35.0                         13.5   5.1   )-----------( 120      ( 16 Aug 2017 07:52 )             39.9                         12.3   6.8   )-----------( 112      ( 15 Aug 2017 07:44 )             36.7     17 Aug 2017 08:06    138    |  105    |  10     ----------------------------<  117    3.8     |  28     |  1.00   15 Aug 2017 07:44    139    |  107    |  20     ----------------------------<  122    4.2     |  23     |  1.20     Ca    8.8        17 Aug 2017 08:06  Ca    8.9        15 Aug 2017 07:44    TPro  5.9    /  Alb  2.2    /  TBili  0.3    /  DBili  .20    /  AST  82     /  ALT  97     /  AlkPhos  102    17 Aug 2017 08:06          Blood Culture: Negative x2  Urine cx: Positive >100k E.coli         RADIOLOGY:    NM HEPATOBILIARY IMG                        PROCEDURE DATE:  08/17/2017    INTERPRETATION:  RADIOPHARMACEUTICAL: 3.0 mCi Tc-99m-Mebrofenin, I.V.  CLINICAL STATEMENT: 72-year-old male with fever. Patient has   cholelithiasis and pericholecystic fluid on recent CT.  TECHNIQUE:  Dynamic imaging of the anterior abdomen was performed for 25   minutes following injection of radiotracer. Static images of the abdomen   in the anterior, right lateral, and right anterior oblique views were   obtained immediately thereafter.  FINDINGS: There is prompt, homogeneous uptake of radiotracer by the   hepatocytes. Activity is first seen in the gallbladder at 10 minutes and   in the bowel at 5 minutes. There is good clearance of activity from the   liver by the end of the study.  IMPRESSION: Normal hepatobiliary scan.  No evidence of acute cholecystitis.       PORTABLE CHEST URGENT                        PROCEDURE DATE:  08/17/2017    INTERPRETATION:  Clinical information: Congestive heart failure.  Technique: Portable AP chest film.  Comparison: Prior chest, abdomen, and pelvis CT examination from   8/16/2017.  Findings: The lungs are grossly clear. The previously noted pulmonary   nodule within the left upper lobe is not clearly seen on this x-ray   examination. The heart size is enlarged. There are mild multilevel   degenerative changes of the thoracic spine.    IMPRESSION: No acute findings.      CT CHEST IC                        PROCEDURE DATE:  08/16/2017    INTERPRETATION:  History: Fever status post cystoscopy, wheezing.    Multislice double contrast CT chest abdomen and pelvis.  95 cc Omnipaque 350 injected intravenously.  Fibroatelectatic stranding left base. No evidence of pneumonia. There is   an indeterminate noncalcified 1 cm left upper lobe parenchymal nodule (2,   27). Correlate with pet imaging. Scattered subcentimeter and borderline   enlarged mediastinal lymph nodes. Patent central airways.  Trace basilar effusions.  Mild cardiomegaly with coronary artery calcification.  Calcified gallstone. Pericholecystic fluid is nonspecific finding in and   itself may be related to liver disease hypoalbuminemia or patient's fluid   status. If there is clinical suspicion for cholecystitis right upper   quadrant ultrasound and/or HIDA scan can be obtained. No biliary   dilatation. Pancreas spleen adrenals not remarkable.  Subcentimeter right renal cyst. Bilateral perirenal stranding is   nonspecific can be seen in the setting of UTI. No hydronephrosis   nonaneurysmal abdominal aorta.  No evidence of appendicitis inflamed or obstructed bowel. Trace pelvic   ascites. No free air.  Slightly prominent prostate. Perivesical fat stranding suggests cystitis   in the appropriate clinical setting. Correlate with symptomatology and   urinalysis.  Subcutaneous gas droplets lower abdomen attributed to injection sites.  Mild dependent subcutaneous edema lower lumbar region  No acute or aggressive osseous pathology.    Impression:    No evidence of pneumonia or other acute intrathoracic pathology.  Indeterminate left upper lobe parenchymal nodule lobe. Correlate with pet   imaging.  Cholelithiasis and pericholecystic fluid. Correlate with right upper   quadrant ultrasound and/or HIDA scan.  Nonspecific perirenal and perivesical stranding may reflect UTI in the   appropriate clinical setting. Correlate with symptomatology and   urinalysis.  Additional findings as discussed.      CHEST PA & LAT                        PROCEDURE DATE:  08/16/2017    INTERPRETATION:  Chest PA and lateral 2 views:  Clinical history:  Fever, wheezing, cough.  Findings:  Mild cardiomegaly. Tortuous calcified thoracic aorta. Mild pulmonary   venous congestion. Bibasilar atelectasis. 1 cm, soft tissue densities,   Lung nodules seen only in the lateral view. CP angles appear normal.    Impression:  Pulmonary venous congestion.  Bibasilar focal atelectasis.  Lung nodule seen on the lateral view.    If clinically indicated, if clinically warranted, further evaluation by    CT scan of the chest recommended.     US KIDNEYS AND BLADDER                        PROCEDURE DATE:  08/15/2017    INTERPRETATION:  History: Sepsis, UTI.  Bilateral renal ultrasound.  Right kidney 12.1 left 11.8 cm long dimension. Normal cortical volume   echotexture. No hydronephrosis shadowing calculus solid or cystic mass   bilaterally.  Bladder distended up to 57 cc. Patient did not void.  Incidental gallstones.    Impression: No hydronephrosis.      EKG:  Ventricular Rate 59 BPM    Atrial Rate 59 BPM    P-R Interval 156 ms    QRS Duration 86 ms    Q-T Interval 398 ms    QTC Calculation(Bezet) 394 ms    P Axis 40 degrees    R Axis 22 degrees    T Axis -20 degrees    Diagnosis Line Sinus bradycardia  T wave abnormality, consider inferior ischemia

## 2017-08-17 NOTE — CONSULT NOTE ADULT - ASSESSMENT
72 year old male with PMHx of HTN, MI (1995), CAD (s/p 2 stents, 1995), Bladder CA (in remission, dx 2013, s/p TURBT and serial surveillance cystoscopies) who presented to the ED with 3 days of fever and chills after a routine screening cystoscopy performed 8/8. Patient admitted for sepsis 2/2 UTI on abx. During hospital course, the patient was found to be spiking fevers 2/2 to possible cholecystitis. Primary team noticed the patient to be s 72 year old male with PMHx of HTN, MI (1995), CAD (s/p 2 stents, 1995), Bladder CA (in remission, dx 2013, s/p TURBT and serial surveillance cystoscopies) who presented to the ED with 3 days of fever and chills after a routine screening cystoscopy performed 8/8. Patient admitted for sepsis 2/2 UTI on abx. Patient continued to spike fevers through hospital course despite tylenol and abx. Primary team is working up patient for secondary cause of fevers, possible cholecystitis.     -fevers, with unclear etiology as of now, Urosepsis? cholecystitis? Urology and ID following. Continue with Tylenol prn.   -sob, improved with 2L NC 02. SpO2 currently on 99% on 2L. Etiology of dyspnea likely related to iatrogenic fluid overload 2/2 sepsis, not likely cardiac related. Patient received 40mg IV lasix. Would d/c fluids for now. Recommend echo to evaluate for cardiomyopathy. Consider ordering Pro-BNP  -EKG: sinus bradycardia 59. f/u repeat EKG. Continue ASA 81mg POqd   -HR stable in the 60's, BP fluctuating between 110's& 150's/ 80's. Continue Atenolol, ACEI. Routine hemodynamic monitoring.   -Maintain and replete electrolytes as needed. Keep K>4, Mg>2.   -further cardiac workup will depend on clinical course  -will continue to follow 72 year old male with PMHx of HTN, MI (1995), CAD (s/p 2 stents, 1995), Bladder CA (in remission, dx 2013, s/p TURBT and serial surveillance cystoscopies) who presented to the ED with 3 days of fever and chills after a routine screening cystoscopy performed 8/8. Patient admitted for sepsis 2/2 UTI on abx. Patient continued to spike fevers through hospital course despite tylenol and abx. Primary team is working up patient for secondary cause of fevers, possible cholecystitis.     -fevers, with unclear etiology as of now, Urosepsis? cholecystitis? Urology and ID following. Continue with Tylenol prn.   -sob, improved with 2L NC 02. SpO2 currently 99% on 2L NC O2. Etiology of dyspnea likely related to iatrogenic fluid overload 2/2 treatment of sepsis, not likely cardiac related. Pt. had received 3L boluses and was placed on continuous IVF at a rate of 125cc/hr. Patient received 40mg IV lasix. Recommend additional dose of IV Lasix 40. Would avoid excess fluids for now until patient develops hypotension. Recommend echo to evaluate for cardiomyopathy. Consider ordering Pro-BNP  -EKG: sinus bradycardia 59. f/u repeat EKG. Continue ASA 81mg POqd   -HR stable in the 60's, BP fluctuating between 110's& 150's/ 80's. Continue Atenolol, ACEI. Routine hemodynamic monitoring.   -Maintain and replete electrolytes as needed. Keep K>4, Mg>2.   -further cardiac workup will depend on clinical course  -will continue to follow 72 year old male with PMHx of HTN, MI (1995), CAD (s/p 2 stents, 1995), Bladder CA (in remission, dx 2013, s/p TURBT and serial surveillance cystoscopies) who presented to the ED with 3 days of fever and chills after a routine screening cystoscopy performed 8/8. Patient admitted for sepsis 2/2 UTI on abx. Patient continued to spike fevers through hospital course despite tylenol and abx. Primary team is working up patient for cause of fever.  With ivf administration he has developed dyspnea and wheezing with hypoxia.      -sob, improved with supplemental 02.  -Etiology of dyspnea likely related to iatrogenic fluid overload 2/2 treatment of sepsis, less likely an acute cardiac process  -he received 40mg IV lasix. Recommend additional dose of IV Lasix 40 this evening  - Would avoid excess fluids for now unless patient develops hypotension.   - although his biventr fxn was normal recently, would repeat echo to evaluate for acute change  -check bnp with am labs  -repeat ekg  -Continue ASA 81mg POqd   -HR stable in the 60's, BP fluctuating between 110's& 150's/ 80's.   -Continue Atenolol, ACEI.   -fevers, with unclear etiology as of now, Urosepsis? cholecystitis? Urology and ID following.   -Maintain and replete electrolytes as needed. Keep K>4, Mg>2.   -further cardiac workup will depend on clinical course  -will continue to follow

## 2017-08-17 NOTE — CHART NOTE - NSCHARTNOTEFT_GEN_A_CORE
S: Called by RN that Pt had acute alter mental statue. Per Intern reported that Pt was A&O x1 altered from A&O x3 which is his baseline. Pt seen and examed at bedside. During the exam, Pt is back to A&O x3. Denies of CP/ SOB/ pain anywhere/palpitation.        O:  Allergies    No Known Allergies    Intolerances        Vitals  Vital Signs Last 24 Hrs  T(C): 39.7 (17 Aug 2017 16:45), Max: 39.7 (17 Aug 2017 16:45)  T(F): 103.4 (17 Aug 2017 16:45), Max: 103.4 (17 Aug 2017 16:45)  HR: 53 (17 Aug 2017 16:25) (53 - 67)  BP: 128/71 (17 Aug 2017 16:25) (119/69 - 157/79)  BP(mean): --  RR: 20 (17 Aug 2017 13:08) (16 - 20)  SpO2: 99% (17 Aug 2017 12:43) (87% - 99%)      General: WN/WD NAD,   Respiratory: + wheezing b/l lower lobes and crackles b/l lower lobes  CV: RRR, S1S2, no murmurs, rubs or gallops  Abdominal: Soft, NT, ND +BS, Last BM  Extremities: No edema, + peripheral pulses  Neurology: A&Ox3, nonfocal, CORDOVA x 4       LABS:                        11.9   6.0   )-----------( 124      ( 17 Aug 2017 08:06 )             35.0     08-17    138  |  105  |  10  ----------------------------<  117<H>  3.8   |  28  |  1.00    Ca    8.8      17 Aug 2017 08:06    TPro  5.9<L>  /  Alb  2.2<L>  /  TBili  0.3  /  DBili  .20  /  AST  82<H>  /  ALT  97<H>  /  AlkPhos  102  08-17              RADIOLOGY & ADDITIONAL TESTS:    A/P: 73 y/o M with PMH bladder CA (diagnosed 2013; in remission), HTN, CAD (s/p stent x2, 2003) admitted for sepsis 2/2 UTI, r/o acute cholecystitis current acute Altered mental status. ? due to infection with unclear sources vs ?encephalopathy vs hypoxia 2/2 CHF  -cbc  -cmp  -lactate  -abg  -repeat UCX and BCX  -ammonia  -CT head  -TTE  -consult / SAMY( neuro)  -case discussed Dr. Perlman

## 2017-08-17 NOTE — CONSULT NOTE ADULT - SUBJECTIVE AND OBJECTIVE BOX
pt seen and examined    full consultation dictated      71 yo male s/p cytoscopy last week presents with persistent fever/wbc.  CT concerning for cholecystitis      -pt scheduled for HIDA today     -will continue to follow

## 2017-08-17 NOTE — PROGRESS NOTE ADULT - SUBJECTIVE AND OBJECTIVE BOX
[facility], Division of Infectious Diseases  [facility]    Name: DESTINEE LAMB  Age: 72y  Gender: Male  MRN: [id number]    Interval History--  Notes reviewed. Patient had some SOB earlier, resolved. No new issues.  CT's personally reviewed. Inflammatory changes involving GB.    Past Medical History--  Bladder cancer  HTN (hypertension)  H/O heart artery stent  S/P hernia surgery      For details regarding the patient's social history, family history, and other miscellaneous elements, please refer the initial infectious diseases consultation and/or the admitting history and physical examination for this admission.    Allergies    No Known Allergies    Intolerances      Medications--  Antibiotics:  piperacillin/tazobactam IVPB.      vancomycin  IVPB   IV Intermittent   piperacillin/tazobactam IVPB. 3.375 Gram(s) IV Intermittent every 8 hours  vancomycin  IVPB 1250 milliGRAM(s) IV Intermittent every 12 hours    Immunologic:    Other:  aspirin  chewable  atorvastatin  ATENolol  Tablet  enoxaparin Injectable  acetaminophen   Tablet PRN  lactobacillus acidophilus  lisinopril  sodium chloride 0.9%.      Review of Systems--  A 10-point review of systems was obtained.     Pertinent positives and negatives--  Constitutional: +fevers. No Chills. No Rigors.   Cardiovascular: No chest pain. No palpitations.  Respiratory: Resolved shortness of breath. No cough.  Gastrointestinal: No nausea or vomiting. No diarrhea or constipation.   Psychiatric: Somewhat irritated, anxious mood and affect.    Review of systems otherwise negative except as previously noted.    Physical Examination--  Vital Signs: T(F): 100.5 (08-17-17 @ 04:49), Max: 103.8 (08-16-17 @ 09:59)  HR: 63 (08-17-17 @ 05:36)  BP: 154/80 (08-17-17 @ 04:14)  RR: 16 (08-17-17 @ 05:36)  SpO2: 97% (08-17-17 @ 05:36)  Wt(kg): --  General: Nontoxic-appearing Male in no acute distress. Less confused.  HEENT: AT/NC. PERRL. EOMI. Anicteric. Conjunctiva pink and moist. Oropharynx clear. Dentition fair.  Neck: Not rigid. No sense of mass.  Nodes: None palpable.  Lungs: Clear bilaterally without rales, wheezing or ronchi  Heart: Regular rate and rhythm. No Murmur. No rub. No gallop. No palpable thrill.  Abdomen: Bowel sounds present and normoactive. Soft. Nondistended. Nontender. No sense of mass. No organomegaly.  Back: No spinal tenderness. No costovertebral angle tenderness.   Extremities: No cyanosis or clubbing. No edema.   Skin: Warm. Dry. Good turgor. No rash. No vasculitic stigmata.  Psychiatric: Appropriate affect and mood for situation.         Laboratory Studies--  CBC                        11.9   6.0   )-----------( 124      ( 17 Aug 2017 08:06 )             35.0       Chemistries  08-17    138  |  105  |  10  ----------------------------<  117<H>  3.8   |  28  |  1.00    Ca    8.8      17 Aug 2017 08:06    Albumin, Serum: 3.2 g/dL (08.13.17 @ 11:07)    < from: CT Chest w/ IV Cont (08.16.17 @ 17:21) >  EXAM:  CT CHEST IC                        PROCEDURE DATE:  08/16/2017    INTERPRETATION:  History: Fever status post cystoscopy, wheezing.    Multislice double contrast CT chest abdomen and pelvis.  95 cc Omnipaque 350 injected intravenously.  Fibroatelectatic stranding left base. No evidence of pneumonia. There is   an indeterminate noncalcified 1 cm left upper lobe parenchymal nodule (2,   27). Correlate with pet imaging. Scattered subcentimeter and borderline   enlarged mediastinallymph nodes. Patent central airways.  Trace basilar effusions.  Mild cardiomegaly with coronary artery calcification.  Calcified gallstone. Pericholecystic fluid is nonspecific finding in and   itself may be related to liver disease hypoalbuminemia or patient's fluid   status. If there is clinical suspicion for cholecystitis right upper   quadrant ultrasound and/or HIDA scan can be obtained. No biliary   dilatation. Pancreas spleen adrenals not remarkable.  Subcentimeter right renal cyst. Bilateral perirenal stranding is   nonspecific can be seen in the setting of UTI. No hydronephrosis   nonaneurysmal abdominal aorta.  No evidence of appendicitis inflamed or obstructed bowel. Trace pelvic   ascites. No free air.  Slightly prominent prostate.Perivesical fat stranding suggests cystitis   in the appropriate clinical setting. Correlate with symptomatology and   urinalysis.  Subcutaneous gas droplets lower abdomen attributed to injection sites.  Mild dependent subcutaneous edema lower lumbarregion  No acute or aggressive osseous pathology.    Impression:    No evidence of pneumonia or other acute intrathoracic pathology.  Indeterminate left upper lobe parenchymal nodule lobe. Correlate with pet   imaging.  Cholelithiasis and pericholecystic fluid. Correlate with right upper   quadrant ultrasound and/or HIDA scan.  Nonspecific perirenal and perivesical stranding may reflect UTI in the   appropriate clinical setting. Correlate with symptomatology and   urinalysis.  Additional findingsas discussed.    CRISTOBAL LAM M.D., ATTENDING RADIOLOGIST  This document has been electronically signed. Aug 16 2017  5:28PM  < end of copied text >      Culture Data  Repeat blood cx data pending.

## 2017-08-17 NOTE — PROGRESS NOTE ADULT - SUBJECTIVE AND OBJECTIVE BOX
neuro cons dict.  AMS LIKELY DUE TO METABOLIC CAUSE/FEBRILE..  DOUBT CVA.  HOLD MRI TODAY.  DW FAMILY.

## 2017-08-17 NOTE — PROGRESS NOTE ADULT - SUBJECTIVE AND OBJECTIVE BOX
Patient is a 72y old M who presents with a chief complaint of fevers/chills (13 Aug 2017 12:40)      INTERVAL HPI/OVERNIGHT EVENTS:  Pt seen and examined.  Reports spiking fever overnight and SOB.  Pt stated breathing improved with Nebs and oxygen.  Denies chest pain or SOB currently.  Denies urinary symptoms.      MEDICATIONS  (STANDING):  aspirin  chewable 81 milliGRAM(s) Oral daily  atorvastatin 20 milliGRAM(s) Oral at bedtime  ATENolol  Tablet 25 milliGRAM(s) Oral daily  enoxaparin Injectable 40 milliGRAM(s) SubCutaneous every 24 hours  lactobacillus acidophilus 1 Tablet(s) Oral every 8 hours  lisinopril 10 milliGRAM(s) Oral daily  sodium chloride 0.9%. 1000 milliLiter(s) (125 mL/Hr) IV Continuous <Continuous>  piperacillin/tazobactam IVPB.      vancomycin  IVPB   IV Intermittent   piperacillin/tazobactam IVPB. 3.375 Gram(s) IV Intermittent every 8 hours  vancomycin  IVPB 1250 milliGRAM(s) IV Intermittent every 12 hours    MEDICATIONS  (PRN):  acetaminophen   Tablet 650 milliGRAM(s) Oral every 6 hours PRN For Temp greater than 38 C (100.4 F)      Allergies    No Known Allergies    Intolerances        REVIEW OF SYSTEMS:  CONSTITUTIONAL: No fever, No chills,No fatigue,No myalgia,No Body ache  EYES: No eye pain, visual disturbances, or discharge  ENMT:  No ear pain, No nose bleed, No vertigo; No sinus or throat pain  NECK: No pain, No stiffness  RESPIRATORY: No cough, wheezing, No  hemoptysis; No shortness of breath  CARDIOVASCULAR: No chest pain, palpitations, leg swelling  GASTROINTESTINAL: No abdominal or epigastric pain. No nausea, No vomiting; No diarrhea or constipation. [ ] BM  GENITOURINARY: No dysuria, No frequency, No urgency, No hematuria, or incontinence  NEUROLOGICAL: alert and oriented x 3,  No headaches, No dizziness, No numbness,  SKIN:   No itching, burning, rashes, or lesions   MUSCULOSKELETAL: No joint pain or swelling; No muscle pain, No back pain, No extremity pain  PSYCHIATRIC: No depression, anxiety, mood swings, or difficulty sleeping  ROS  [ ] Unable to obtain   REST OF REVIEW Of SYSTEM - [ ] Normal     Height (cm): 177.8 (08-13 @ 10:17)  Weight (kg): 86.2 (08-13 @ 10:17)  BMI (kg/m2): 27.3 (08-13 @ 10:17)  BSA (m2): 2.04 (08-13 @ 10:17)  Vital Signs Last 24 Hrs  T(C): 38.1 (17 Aug 2017 04:49), Max: 38.9 (16 Aug 2017 21:34)  T(F): 100.5 (17 Aug 2017 04:49), Max: 102 (16 Aug 2017 21:34)  HR: 63 (17 Aug 2017 05:36) (60 - 67)  BP: 154/80 (17 Aug 2017 04:14) (105/67 - 154/80)  BP(mean): --  RR: 16 (17 Aug 2017 05:36) (16 - 16)  SpO2: 97% (17 Aug 2017 05:36) (87% - 97%)  [ ] room air   [ ] 02    PHYSICAL EXAM:  GENERAL:  No acute distresss,  [ ] Agitated, [ ] Lethargy, [ ] confused   HEAD:  normal  ENMT: normal  NECK:  normal    NERVOUS SYSTEM:  Alert & Oriented X3, no focal deficits [ ]Confusion  [ ] Encephalopathic [ ] Sedated [ ] Other  CHEST/LUNG: Clear to auscultation bilaterally,  [ ] decreased breath sounds at bases  [ ] wheezing   [ ] rhonchi  [ ] crackles  HEART:  Regular rate and rhythm, No murmurs, rubs, or gallops,  [ ] irregular   ABDOMEN:  soft, nontender, nondistended, positive bowel sounds   [ ] obese  EXTREMITIES: No clubbing, cyanosis or edema  SKIN: [ ] venous stasis skin changes    LABS:                        11.9   6.0   )-----------( 124      ( 17 Aug 2017 08:06 )             35.0     17 Aug 2017 08:06    138    |  105    |  10     ----------------------------<  117    3.8     |  28     |  1.00     Ca    8.8        17 Aug 2017 08:06    TPro  5.9    /  Alb  2.2    /  TBili  0.3    /  DBili  .20    /  AST  82     /  ALT  97     /  AlkPhos  102    17 Aug 2017 08:06          CAPILLARY BLOOD GLUCOSE        Cultures          RADIOLOGY & ADDITIONAL TESTS:      Care Discussed with [X] Consultants  [ ] Patient  [ ] Family  [X]   /   [ ] Other; RN  DVT prophylaxis [ ] lovenox   [ ] subq heparin  [ ] coumadin  [ ] venodynes [ ] ambulating frequently at how risk for vte and no pharm         or  mechanical prophylaxis required    [ ] other   Advanced directive:    [ ]pt has hcp     [ ] pt declined to assign hcp  Discussed with pt @ bedside Patient is a 72y old M who presents with a chief complaint of fevers/chills (13 Aug 2017 12:40)      INTERVAL HPI/OVERNIGHT EVENTS:  Pt seen and examined.  Reports spiking fever overnight and SOB.  Pt stated breathing improved with Nebs and oxygen.  Denies chest pain or SOB currently.  Denies urinary symptoms.      MEDICATIONS  (STANDING):  aspirin  chewable 81 milliGRAM(s) Oral daily  atorvastatin 20 milliGRAM(s) Oral at bedtime  ATENolol  Tablet 25 milliGRAM(s) Oral daily  enoxaparin Injectable 40 milliGRAM(s) SubCutaneous every 24 hours  lactobacillus acidophilus 1 Tablet(s) Oral every 8 hours  lisinopril 10 milliGRAM(s) Oral daily  sodium chloride 0.9%. 1000 milliLiter(s) (125 mL/Hr) IV Continuous <Continuous>  piperacillin/tazobactam IVPB.      vancomycin  IVPB   IV Intermittent   piperacillin/tazobactam IVPB. 3.375 Gram(s) IV Intermittent every 8 hours  vancomycin  IVPB 1250 milliGRAM(s) IV Intermittent every 12 hours    MEDICATIONS  (PRN):  acetaminophen   Tablet 650 milliGRAM(s) Oral every 6 hours PRN For Temp greater than 38 C (100.4 F)      Allergies    No Known Allergies    Intolerances        REVIEW OF SYSTEMS:  CONSTITUTIONAL: No fever, No chills,No fatigue,No myalgia,No Body ache  EYES: No eye pain, visual disturbances, or discharge  ENMT:  No ear pain, No nose bleed, No vertigo; No sinus or throat pain  NECK: No pain, No stiffness  RESPIRATORY: mild dyspnea  CARDIOVASCULAR: No chest pain, palpitations, leg swelling  GASTROINTESTINAL: No abdominal or epigastric pain. No nausea, No vomiting; No diarrhea or constipation. [ ] BM  GENITOURINARY: No dysuria, No frequency, No urgency, No hematuria, or incontinence  NEUROLOGICAL: alert and oriented x 3,  No headaches, No dizziness, No numbness,  SKIN:   No itching, burning, rashes, or lesions   MUSCULOSKELETAL: No joint pain or swelling; No muscle pain, No back pain, No extremity pain  PSYCHIATRIC: No depression, anxiety, mood swings, or difficulty sleeping  ROS  [ ] Unable to obtain   REST OF REVIEW Of SYSTEM - [ ] Normal     Height (cm): 177.8 (08-13 @ 10:17)  Weight (kg): 86.2 (08-13 @ 10:17)  BMI (kg/m2): 27.3 (08-13 @ 10:17)  BSA (m2): 2.04 (08-13 @ 10:17)  Vital Signs Last 24 Hrs  T(C): 38.1 (17 Aug 2017 04:49), Max: 38.9 (16 Aug 2017 21:34)  T(F): 100.5 (17 Aug 2017 04:49), Max: 102 (16 Aug 2017 21:34)  HR: 63 (17 Aug 2017 05:36) (60 - 67)  BP: 154/80 (17 Aug 2017 04:14) (105/67 - 154/80)  BP(mean): --  RR: 16 (17 Aug 2017 05:36) (16 - 16)  SpO2: 97% (17 Aug 2017 05:36) (87% - 97%)  [ ] room air   [ ] 02    PHYSICAL EXAM:  GENERAL:  No acute distresss,  [ ] Agitated, [ ] Lethargy, [ ] confused   HEAD:  normal  ENMT: normal  NECK:  normal    NERVOUS SYSTEM:  Alert & Oriented X3, no focal deficits [ ]Confusion  [ ] Encephalopathic [ ] Sedated [ ] Other  CHEST/LUNG: Clear to auscultation bilaterally,  [ ] decreased breath sounds at bases  [ ] wheezing   [ ] rhonchi  [ ] crackles  HEART:  Regular rate and rhythm, No murmurs, rubs, or gallops,  [ ] irregular   ABDOMEN:  soft, nontender, nondistended, positive bowel sounds   [ ] obese  EXTREMITIES: No clubbing, cyanosis or edema  SKIN: [ ] venous stasis skin changes    LABS:                        11.9   6.0   )-----------( 124      ( 17 Aug 2017 08:06 )             35.0     17 Aug 2017 08:06    138    |  105    |  10     ----------------------------<  117    3.8     |  28     |  1.00     Ca    8.8        17 Aug 2017 08:06    TPro  5.9    /  Alb  2.2    /  TBili  0.3    /  DBili  .20    /  AST  82     /  ALT  97     /  AlkPhos  102    17 Aug 2017 08:06          Cultures          RADIOLOGY & ADDITIONAL TESTS:      Care Discussed with [X] Consultants  [ ] Patient  [ ] Family  [X]   /   [ ] Other; RN  DVT prophylaxis [ ] lovenox   [ ] subq heparin  [ ] coumadin  [ ] venodynes [ ] ambulating frequently at how risk for vte and no pharm         or  mechanical prophylaxis required    [ ] other   Advanced directive:    [ ]pt has hcp     [ ] pt declined to assign hcp  Discussed with pt @ bedside

## 2017-08-17 NOTE — PROGRESS NOTE ADULT - ASSESSMENT
Persistent fever.  Patient's fluid status and albumin do not explain GB findings on CT scan.  TB elevated at admission. Gilbert's? I have asked the lab to add on LFT this am.  Abdominal exam relatively benign, patient denies pain at this point, did have N/V PTA  No clinical concern of pneumonia  Temps perhaps moderated a bit.  S/P surveillance cystoscopy, SHIN for bladder cancer per patient report.

## 2017-08-17 NOTE — PROGRESS NOTE ADULT - SUBJECTIVE AND OBJECTIVE BOX
INTERVAL HPI/OVERNIGHT EVENTS: had 102 last night again, no rigors. Had sob last night , rxed with nebulizer. he has no hx of this.    MEDICATIONS  (STANDING):  aspirin  chewable 81 milliGRAM(s) Oral daily  atorvastatin 20 milliGRAM(s) Oral at bedtime  ATENolol  Tablet 25 milliGRAM(s) Oral daily  enoxaparin Injectable 40 milliGRAM(s) SubCutaneous every 24 hours  lactobacillus acidophilus 1 Tablet(s) Oral every 8 hours  lisinopril 10 milliGRAM(s) Oral daily  sodium chloride 0.9%. 1000 milliLiter(s) (125 mL/Hr) IV Continuous <Continuous>  piperacillin/tazobactam IVPB.      vancomycin  IVPB   IV Intermittent   piperacillin/tazobactam IVPB. 3.375 Gram(s) IV Intermittent every 8 hours  vancomycin  IVPB 1250 milliGRAM(s) IV Intermittent every 12 hours    MEDICATIONS  (PRN):  acetaminophen   Tablet 650 milliGRAM(s) Oral every 6 hours PRN For Temp greater than 38 C (100.4 F)        Vital Signs Last 24 Hrs  T(C): 38.1 (17 Aug 2017 04:49), Max: 39.9 (16 Aug 2017 09:59)  T(F): 100.5 (17 Aug 2017 04:49), Max: 103.8 (16 Aug 2017 09:59)  HR: 63 (17 Aug 2017 05:36) (60 - 67)  BP: 154/80 (17 Aug 2017 04:14) (102/61 - 154/80)  BP(mean): --  RR: 16 (17 Aug 2017 05:36) (16 - 16)  SpO2: 97% (17 Aug 2017 05:36) (87% - 97%)    PHYSICAL EXAM:    ABDOMEN: soft, non tender    LABS:                        13.5   5.1   )-----------( 120      ( 16 Aug 2017 07:52 )             39.9     08-15    139  |  107  |  20  ----------------------------<  122<H>  4.2   |  23  |  1.20    Ca    8.9      15 Aug 2017 07:44              RADIOLOGY & ADDITIONAL TESTS:  < from: CT Chest w/ IV Cont (08.16.17 @ 17:21) >  XAM:  CT CHEST IC                            PROCEDURE DATE:  08/16/2017          INTERPRETATION:  History: Fever status post cystoscopy, wheezing.    Multislice double contrast CT chest abdomen and pelvis.  95 cc Omnipaque 350 injected intravenously.  Fibroatelectatic stranding left base. No evidence of pneumonia. There is   an indeterminate noncalcified 1 cm left upper lobe parenchymal nodule (2,   27). Correlate with pet imaging. Scattered subcentimeter and borderline   enlarged mediastinallymph nodes. Patent central airways.  Trace basilar effusions.  Mild cardiomegaly with coronary artery calcification.  Calcified gallstone. Pericholecystic fluid is nonspecific finding in and   itself may be related to liver disease hypoalbuminemia or patient's fluid   status. If there is clinical suspicion for cholecystitis right upper   quadrant ultrasound and/or HIDA scan can be obtained. No biliary   dilatation. Pancreas spleen adrenals not remarkable.  Subcentimeter right renal cyst. Bilateral perirenal stranding is   nonspecific can be seen in the setting of UTI. No hydronephrosis   nonaneurysmal abdominal aorta.  No evidence of appendicitis inflamed or obstructed bowel. Trace pelvic   ascites. No free air.  Slightly prominent prostate.Perivesical fat stranding suggests cystitis   in the appropriate clinical setting. Correlate with symptomatology and   urinalysis.  Subcutaneous gas droplets lower abdomen attributed to injection sites.  Mild dependent subcutaneous edema lower lumbarregion  No acute or aggressive osseous pathology.    Impression:    No evidence of pneumonia or other acute intrathoracic pathology.  Indeterminate left upper lobe parenchymal nodule lobe. Correlate with pet   imaging.  Cholelithiasis and pericholecystic fluid. Correlate with right upper   quadrant ultrasound and/or HIDA scan.  Nonspecific perirenal and perivesical stranding may reflect UTI in the   appropriate clinical setting. Correlate with symptomatology and   urinalysis.  Additional findingsas discussed.                CRISTOBAL LAM M.D., ATTENDING RADIOLOGIST  This document has been electronically signed. Aug 16 20    < end of copied text >

## 2017-08-17 NOTE — CHART NOTE - NSCHARTNOTEFT_GEN_A_CORE
71 yo M with PMHx of bladder cancer, HTN, CAD, admitted for sepsis secondary to UTI. Currently being treated for sepsis.  Called by RN for shortness of breath.  Pt. examined at bedside. He reports that he is having difficulty breathing and is very short of breath. He says that this has been happening since admission, but never this severely. He reports that this SOB is preventing him from sleeping. He denies any chest pain but admits to chest tightness and wheezing. He denies any fevers, chills, dizziness, of abdominal pain at this time.     Vital Signs Last 24 Hrs  T(C): 38.1 (17 Aug 2017 04:49), Max: 39.9 (16 Aug 2017 09:59)  T(F): 100.5 (17 Aug 2017 04:49), Max: 103.8 (16 Aug 2017 09:59)  HR: 63 (17 Aug 2017 05:36) (60 - 67)  BP: 154/80 (17 Aug 2017 04:14) (102/61 - 154/80)  BP(mean): --  RR: 16 (17 Aug 2017 05:36) (16 - 16)  SpO2: 97% (17 Aug 2017 05:36) (87% - 97%)    PE:    Gen: AAOx3, Anxious, struggling to catch breath  Cardio: RRR, S1 and S2, no murmurs  Lungs: mild wheezing appreciated bilaterally on expiration; no crackles  Abdomen: soft, nontender, nondistended  Ext: no peripheral edema appreciated    A/P; 71 yo M with PMHx of bladder cancer, HTN, CAD, admitted for sepsis secondary to UTI. Currently being treated for sepsis. Now with shortness of breath. Pt's O2 sat is 90% on room air.   - 1x dose of Duoneb ordered given complaint of chest tightness  - oxygen given by nasal cannula   - case discussed with senior resident  - nurse made aware, follow up as needed

## 2017-08-18 ENCOUNTER — TRANSCRIPTION ENCOUNTER (OUTPATIENT)
Age: 72
End: 2017-08-18

## 2017-08-18 DIAGNOSIS — A41.9 SEPSIS, UNSPECIFIED ORGANISM: ICD-10-CM

## 2017-08-18 LAB
ANION GAP SERPL CALC-SCNC: 4 MMOL/L — LOW (ref 5–17)
BUN SERPL-MCNC: 9 MG/DL — SIGNIFICANT CHANGE UP (ref 7–23)
CALCIUM SERPL-MCNC: 9.2 MG/DL — SIGNIFICANT CHANGE UP (ref 8.5–10.1)
CHLORIDE SERPL-SCNC: 104 MMOL/L — SIGNIFICANT CHANGE UP (ref 96–108)
CO2 SERPL-SCNC: 32 MMOL/L — HIGH (ref 22–31)
CREAT SERPL-MCNC: 0.99 MG/DL — SIGNIFICANT CHANGE UP (ref 0.5–1.3)
CULTURE RESULTS: SIGNIFICANT CHANGE UP
CULTURE RESULTS: SIGNIFICANT CHANGE UP
GLUCOSE SERPL-MCNC: 111 MG/DL — HIGH (ref 70–99)
HCT VFR BLD CALC: 35.6 % — LOW (ref 39–50)
HGB BLD-MCNC: 12.1 G/DL — LOW (ref 13–17)
MCHC RBC-ENTMCNC: 32 PG — SIGNIFICANT CHANGE UP (ref 27–34)
MCHC RBC-ENTMCNC: 33.9 GM/DL — SIGNIFICANT CHANGE UP (ref 32–36)
MCV RBC AUTO: 94.4 FL — SIGNIFICANT CHANGE UP (ref 80–100)
PLATELET # BLD AUTO: 161 K/UL — SIGNIFICANT CHANGE UP (ref 150–400)
POTASSIUM SERPL-MCNC: 3.6 MMOL/L — SIGNIFICANT CHANGE UP (ref 3.5–5.3)
POTASSIUM SERPL-SCNC: 3.6 MMOL/L — SIGNIFICANT CHANGE UP (ref 3.5–5.3)
RAPID RVP RESULT: SIGNIFICANT CHANGE UP
RBC # BLD: 3.78 M/UL — LOW (ref 4.2–5.8)
RBC # FLD: 12.1 % — SIGNIFICANT CHANGE UP (ref 10.3–14.5)
SODIUM SERPL-SCNC: 140 MMOL/L — SIGNIFICANT CHANGE UP (ref 135–145)
SPECIMEN SOURCE: SIGNIFICANT CHANGE UP
SPECIMEN SOURCE: SIGNIFICANT CHANGE UP
WBC # BLD: 6.8 K/UL — SIGNIFICANT CHANGE UP (ref 3.8–10.5)
WBC # FLD AUTO: 6.8 K/UL — SIGNIFICANT CHANGE UP (ref 3.8–10.5)

## 2017-08-18 PROCEDURE — 93010 ELECTROCARDIOGRAM REPORT: CPT

## 2017-08-18 PROCEDURE — 99223 1ST HOSP IP/OBS HIGH 75: CPT

## 2017-08-18 RX ORDER — POTASSIUM CHLORIDE 20 MEQ
40 PACKET (EA) ORAL ONCE
Qty: 0 | Refills: 0 | Status: COMPLETED | OUTPATIENT
Start: 2017-08-18 | End: 2017-08-18

## 2017-08-18 RX ORDER — FUROSEMIDE 40 MG
40 TABLET ORAL ONCE
Qty: 0 | Refills: 0 | Status: COMPLETED | OUTPATIENT
Start: 2017-08-18 | End: 2017-08-18

## 2017-08-18 RX ADMIN — PIPERACILLIN AND TAZOBACTAM 25 GRAM(S): 4; .5 INJECTION, POWDER, LYOPHILIZED, FOR SOLUTION INTRAVENOUS at 23:13

## 2017-08-18 RX ADMIN — ENOXAPARIN SODIUM 40 MILLIGRAM(S): 100 INJECTION SUBCUTANEOUS at 11:58

## 2017-08-18 RX ADMIN — ATORVASTATIN CALCIUM 20 MILLIGRAM(S): 80 TABLET, FILM COATED ORAL at 23:13

## 2017-08-18 RX ADMIN — Medication 1 TABLET(S): at 05:31

## 2017-08-18 RX ADMIN — LISINOPRIL 10 MILLIGRAM(S): 2.5 TABLET ORAL at 05:31

## 2017-08-18 RX ADMIN — Medication 1 TABLET(S): at 23:13

## 2017-08-18 RX ADMIN — PIPERACILLIN AND TAZOBACTAM 25 GRAM(S): 4; .5 INJECTION, POWDER, LYOPHILIZED, FOR SOLUTION INTRAVENOUS at 15:36

## 2017-08-18 RX ADMIN — PIPERACILLIN AND TAZOBACTAM 25 GRAM(S): 4; .5 INJECTION, POWDER, LYOPHILIZED, FOR SOLUTION INTRAVENOUS at 05:31

## 2017-08-18 RX ADMIN — Medication 40 MILLIGRAM(S): at 09:51

## 2017-08-18 RX ADMIN — Medication 1 TABLET(S): at 15:28

## 2017-08-18 RX ADMIN — Medication 81 MILLIGRAM(S): at 11:58

## 2017-08-18 RX ADMIN — Medication 166.67 MILLIGRAM(S): at 15:27

## 2017-08-18 RX ADMIN — Medication 40 MILLIEQUIVALENT(S): at 09:51

## 2017-08-18 RX ADMIN — Medication 166.67 MILLIGRAM(S): at 03:30

## 2017-08-18 NOTE — DISCHARGE NOTE ADULT - CARE PLAN
Principal Discharge DX:	Fever and chills  Goal:	Clinical improvement  Instructions for follow-up, activity and diet:	Continue Vanco and Zosyn  Continue to follow temps and CBC  Secondary Diagnosis:	Acute cystitis without hematuria  Secondary Diagnosis:	Bladder cancer  Secondary Diagnosis:	CAD (coronary artery disease)  Secondary Diagnosis:	HTN (hypertension)  Secondary Diagnosis:	Pulmonary nodule, left Principal Discharge DX:	Fever and chills  Goal:	Clinical improvement  Instructions for follow-up, activity and diet:	follow up with primary doctor next week  Secondary Diagnosis:	Acute cystitis without hematuria  Instructions for follow-up, activity and diet:	follow up with Dr Lan  in 2 weeks  Secondary Diagnosis:	Bladder cancer  Instructions for follow-up, activity and diet:	follow up with Dr Lan in 2 weeks  Secondary Diagnosis:	CAD (coronary artery disease)  Instructions for follow-up, activity and diet:	follow up with primary doctor next week  Secondary Diagnosis:	HTN (hypertension)  Instructions for follow-up, activity and diet:	follow up with primary doctor next week  Secondary Diagnosis:	Pulmonary nodule, left  Instructions for follow-up, activity and diet:	follow up with primary doctor next week

## 2017-08-18 NOTE — DISCHARGE NOTE ADULT - SECONDARY DIAGNOSIS.
Acute cystitis without hematuria Bladder cancer CAD (coronary artery disease) HTN (hypertension) Pulmonary nodule, left

## 2017-08-18 NOTE — PROGRESS NOTE ADULT - PROBLEM SELECTOR PLAN 4
In remission. diagnosed 2013. Treated by Dr. Lan with TURBT/serial cysto.
SHIN.  Rx as per urology
Suspect due to febrile illness

## 2017-08-18 NOTE — PROGRESS NOTE ADULT - SUBJECTIVE AND OBJECTIVE BOX
INTERVAL HPI/OVERNIGHT EVENTS: Fells much better, family reports he is much cleare mentally. Last temp 16:45 8/17. had one episode of diarrhea just now.    MEDICATIONS  (STANDING):  aspirin  chewable 81 milliGRAM(s) Oral daily  atorvastatin 20 milliGRAM(s) Oral at bedtime  ATENolol  Tablet 25 milliGRAM(s) Oral daily  enoxaparin Injectable 40 milliGRAM(s) SubCutaneous every 24 hours  lactobacillus acidophilus 1 Tablet(s) Oral every 8 hours  lisinopril 10 milliGRAM(s) Oral daily  piperacillin/tazobactam IVPB.      vancomycin  IVPB   IV Intermittent   piperacillin/tazobactam IVPB. 3.375 Gram(s) IV Intermittent every 8 hours  vancomycin  IVPB 1250 milliGRAM(s) IV Intermittent every 12 hours    MEDICATIONS  (PRN):  acetaminophen   Tablet 650 milliGRAM(s) Oral every 6 hours PRN For Temp greater than 38 C (100.4 F)        Vital Signs Last 24 Hrs  T(C): 37.4 (18 Aug 2017 05:44), Max: 39.7 (17 Aug 2017 16:45)  T(F): 99.3 (18 Aug 2017 05:44), Max: 103.4 (17 Aug 2017 16:45)  HR: 53 (18 Aug 2017 05:44) (50 - 57)  BP: 155/78 (18 Aug 2017 05:44) (128/71 - 157/79)  BP(mean): --  RR: 18 (18 Aug 2017 05:44) (18 - 20)  SpO2: 94% (18 Aug 2017 05:44) (94% - 99%)    PHYSICAL EXAM:    ABDOMEN: soft, non tender    LABS:                        12.1   6.8   )-----------( 161      ( 18 Aug 2017 08:21 )             35.6     08-18    140  |  104  |  9   ----------------------------<  111<H>  3.6   |  32<H>  |  0.99    Ca    9.2      18 Aug 2017 08:21    TPro  6.4  /  Alb  2.3<L>  /  TBili  0.5  /  DBili  x   /  AST  80<H>  /  ALT  99<H>  /  AlkPhos  116  08-17            RADIOLOGY & ADDITIONAL TESTS:

## 2017-08-18 NOTE — DISCHARGE NOTE ADULT - CARE PROVIDERS DIRECT ADDRESSES
[email or web address]

## 2017-08-18 NOTE — PROGRESS NOTE ADULT - SUBJECTIVE AND OBJECTIVE BOX
[facility], Division of Infectious Diseases  [facility] [address] [place]    Name: DESTINEE LAMB  Age: 72y  Gender: Male  MRN: [id number]    Interval History--  Notes reviewed. HIDA reviewed. Discussed with Vidhi Fonseca, HARINDER Wynn, TIFFANY Johnson, & PHILIPP Love. Patient with dry cough only complaint. No pain. Fevers appear to have abated. Energy level markedly increased. No SOB. No CP. No urinary symptoms. No back pain/flank pain. No N/V/D.    Past Medical History--  Bladder cancer  HTN (hypertension)  H/O heart artery stent  S/P hernia surgery      For details regarding the patient's social history, family history, and other miscellaneous elements, please refer the initial infectious diseases consultation and/or the admitting history and physical examination for this admission.    Allergies    No Known Allergies    Intolerances        Medications--  Antibiotics:  piperacillin/tazobactam IVPB.      vancomycin  IVPB   IV Intermittent   piperacillin/tazobactam IVPB. 3.375 Gram(s) IV Intermittent every 8 hours  vancomycin  IVPB 1250 milliGRAM(s) IV Intermittent every 12 hours    Immunologic:    Other:  aspirin  chewable  atorvastatin  ATENolol  Tablet  enoxaparin Injectable  acetaminophen   Tablet PRN  lactobacillus acidophilus  lisinopril      Review of Systems--    Review of systems otherwise unchanged compared with prior visit except as previously noted.    Physical Examination--  Vital Signs: T(F): 99.3 (08-18-17 @ 05:44), Max: 103.4 (08-17-17 @ 16:45)  HR: 60 (08-18-17 @ 14:08)  BP: 130/68 (08-18-17 @ 14:08)  RR: 17 (08-18-17 @ 14:08)  SpO2: 68% (08-18-17 @ 14:08)  Wt(kg): --  General: Nontoxic-appearing Male in no acute distress. Less confused.  HEENT: AT/NC. PERRL. EOMI. Anicteric. Conjunctiva pink and moist. Oropharynx clear. Dentition fair.  Neck: Not rigid. No sense of mass.  Nodes: None palpable.  Lungs: Clear bilaterally without rales, wheezing or ronchi  Heart: Regular rate and rhythm. No Murmur. No rub. No gallop. No palpable thrill.  Abdomen: Bowel sounds present and normoactive. Soft. Nondistended. Nontender. No sense of mass. No organomegaly.  Back: No spinal tenderness. No costovertebral angle tenderness.   Extremities: No cyanosis or clubbing. No edema.   Skin: Warm. Dry. Good turgor. No rash. No vasculitic stigmata.  Psychiatric: Appropriate affect and mood for situation.       Laboratory Studies--  CBC                        12.1   6.8   )-----------( 161      ( 18 Aug 2017 08:21 )             35.6       Chemistries  08-18    140  |  104  |  9   ----------------------------<  111<H>  3.6   |  32<H>  |  0.99    Ca    9.2      18 Aug 2017 08:21    TPro  6.4  /  Alb  2.3<L>  /  TBili  0.5  /  DBili  x   /  AST  80<H>  /  ALT  99<H>  /  AlkPhos  116  08-17    < from: NM Hepatobiliary Scan w/wo Gall Bladder (08.17.17 @ 15:05) >  IMPRESSION: Normal hepatobiliary scan.    No evidence of acute cholecystitis.  < end of copied text >      Culture Data  Culture - Blood (08.16.17 @ 18:29)    Specimen Source: .Blood Blood    Culture Results:   No growth to date.    Culture - Urine (08.16.17 @ 16:18)    Specimen Source: .Urine Clean Catch (Midstream)    Culture Results:   No growth    Culture - Blood (08.16.17 @ 12:35)    Specimen Source: .Blood Blood    Culture Results:   No growth to date.

## 2017-08-18 NOTE — PROGRESS NOTE ADULT - SUBJECTIVE AND OBJECTIVE BOX
Patient is a 72y old M who presents with a chief complaint of fevers/chills (13 Aug 2017 12:40)      INTERVAL HPI/OVERNIGHT EVENTS:  Pt reports feeling improved.  Pt has a mild dry cough but states he did not have fever or chills over night, no chest pain or SOB.  Denies urinary symptoms.      MEDICATIONS  (STANDING):  aspirin  chewable 81 milliGRAM(s) Oral daily  atorvastatin 20 milliGRAM(s) Oral at bedtime  ATENolol  Tablet 25 milliGRAM(s) Oral daily  enoxaparin Injectable 40 milliGRAM(s) SubCutaneous every 24 hours  lactobacillus acidophilus 1 Tablet(s) Oral every 8 hours  lisinopril 10 milliGRAM(s) Oral daily  piperacillin/tazobactam IVPB.      vancomycin  IVPB   IV Intermittent   piperacillin/tazobactam IVPB. 3.375 Gram(s) IV Intermittent every 8 hours  vancomycin  IVPB 1250 milliGRAM(s) IV Intermittent every 12 hours    MEDICATIONS  (PRN):  acetaminophen   Tablet 650 milliGRAM(s) Oral every 6 hours PRN For Temp greater than 38 C (100.4 F)      Allergies    No Known Allergies    Intolerances        REVIEW OF SYSTEMS:  CONSTITUTIONAL: No fever, No chills,No fatigue,No myalgia,No Body ache  EYES: No eye pain, visual disturbances, or discharge  ENMT: No sinus or throat pain  NECK: No pain, No stiffness  RESPIRATORY: Mild dry cough, No wheezing, No hemoptysis; No shortness of breath  CARDIOVASCULAR: No chest pain, palpitations, leg swelling  GASTROINTESTINAL: No abdominal or epigastric pain. No nausea, No vomiting; No diarrhea or constipation. [ ] BM  GENITOURINARY: No dysuria, No frequency, No urgency, No hematuria, or incontinence  NEUROLOGICAL: alert and oriented x 3,  No headaches, No dizziness, No numbness,  MUSCULOSKELETAL: No joint pain or swelling; No muscle pain, No back pain, No extremity pain  PSYCHIATRIC: No depression, anxiety, mood swings, still has difficulty sleeping  ROS  [ ] Unable to obtain   REST OF REVIEW Of SYSTEM - [x] Normal     Height (cm): 177.8 (08-13 @ 10:17)  Weight (kg): 86.2 (08-13 @ 10:17)  BMI (kg/m2): 27.3 (08-13 @ 10:17)  BSA (m2): 2.04 (08-13 @ 10:17)  Vital Signs Last 24 Hrs  T(C): 37.4 (18 Aug 2017 05:44), Max: 39.7 (17 Aug 2017 16:45)  T(F): 99.3 (18 Aug 2017 05:44), Max: 103.4 (17 Aug 2017 16:45)  HR: 53 (18 Aug 2017 05:44) (50 - 57)  BP: 155/78 (18 Aug 2017 05:44) (128/71 - 157/79)  BP(mean): --  RR: 18 (18 Aug 2017 05:44) (18 - 20)  SpO2: 94% (18 Aug 2017 05:44) (94% - 99%)  [ ] room air   [ ] 02    PHYSICAL EXAM:  GENERAL:  No acute distress,  [ ] Agitated, [ ] Lethargy, [ ] confused   HEAD:  normal  ENMT: normal  NECK:  normal    NERVOUS SYSTEM:  Alert & Oriented X3, no focal deficits  CHEST/LUNG: Clear to auscultation bilaterally  HEART:  Regular rate and rhythm, No murmurs, rubs, or gallops,    ABDOMEN:  soft, nontender, nondistended, positive bowel sounds     EXTREMITIES: No clubbing, cyanosis or edema  SKIN: [ ] venous stasis skin changes    LABS:                        12.1   6.8   )-----------( 161      ( 18 Aug 2017 08:21 )             35.6     18 Aug 2017 08:21    140    |  104    |  9      ----------------------------<  111    3.6     |  32     |  0.99     Ca    9.2        18 Aug 2017 08:21    TPro  6.4    /  Alb  2.3    /  TBili  0.5    /  DBili  x      /  AST  80     /  ALT  99     /  AlkPhos  116    17 Aug 2017 16:50          CAPILLARY BLOOD GLUCOSE        Cultures          RADIOLOGY & ADDITIONAL TESTS:      Care Discussed with [X] Consultants  [ ] Patient  [ ] Family  [X]   /   [ ] Other; RN  DVT prophylaxis [ ] lovenox   [ ] subq heparin  [ ] coumadin  [ ] venodynes [ ] ambulating frequently at how risk for vte and no pharm         or  mechanical prophylaxis required    [ ] other   Advanced directive:    [ ]pt has hcp     [ ] pt declined to assign hcp  Discussed with pt @ bedside

## 2017-08-18 NOTE — PROGRESS NOTE ADULT - SUBJECTIVE AND OBJECTIVE BOX
Neurology Follow up note    DESTINEE [id number]    HPI:  Pt is a 73 y/o M with PMHx of HTN, CAD (s/p 2 stents, 1995), bladder cancer (in remission; dx 2013, treated with TURBT and serial surveillance cystoscopies) who presented to the ED with 3 days of fever and chills after a routine screening cystoscopy performed 8/8 (5 days prior to presentation).  Pt states he did receive 1 dose of antibiotics at the time of cystoscopy but does not recall the name.  Pt stated that the he first felt a chill on Thursday but felt well enough to play baseball on Friday.  However, on Friday evening he began experiencing fever (per pt's wife Tmax 103F), chills, and drenching sweats.  He was given Advil/Tylenol by his wife for the fever.  Pt also endorses 2 episodes of non-bloody emesis prior to presentation.  He has noted decreased oral intake since Friday due to nausea/vomiting and has not be drinking a significant amount of fluid.  Patient ever having denied dysuria, frequency, incontinence, or urgency, but notes that it "feels different" when he urinates and that his urine is "orange." He also endorses dizziness when walking.      In the ED, pt was febrile (103.2F). All other vital signs WNL.  Labs were significant for WBC18.0 with left shift; UA (+) for LE and nitrite, moderate bacteria.  Lactate 1.5. Urine/blood cxs ordered. CXR: mild cardiomegaly; EKG not yet done. In the ED, pt received ceftriaxone 1g IV, Tylenol 650 mg, and NS Bolus x3. (13 Aug 2017 12:40)      Interval History - bno new events.    Patient is seen, chart was reviewed and case was discussed with the treatment team.  Pt is not in any distress.   Lying on bed comfortably.   No events reported overnight.   No clinical seizure was reported.  Sitting on chair bed comfortably.    is at bedside.    Vital Signs Last 24 Hrs  T(C): 37.4 (18 Aug 2017 05:44), Max: 39.7 (17 Aug 2017 16:45)  T(F): 99.3 (18 Aug 2017 05:44), Max: 103.4 (17 Aug 2017 16:45)  HR: 60 (18 Aug 2017 14:08) (50 - 60)  BP: 130/68 (18 Aug 2017 14:08) (128/71 - 155/78)  BP(mean): --  RR: 17 (18 Aug 2017 14:08) (17 - 18)  SpO2: 68% (18 Aug 2017 14:08) (68% - 98%)        REVIEW OF SYSTEMS:    Constitutional: No fever, weight loss or fatigue  Eyes: No eye pain, visual disturbances, or discharge  ENT:  No difficulty hearing, tinnitus, vertigo; No sinus or throat pain  Neck: No pain or stiffness  Respiratory: No cough, wheezing, chills or hemoptysis  Cardiovascular: No chest pain, palpitations, shortness of breath, dizziness or leg swelling  Gastrointestinal: No abdominal or epigastric pain. No nausea, vomiting or hematemesis; No diarrhea or constipation. No melena or hematochezia.  Genitourinary: No dysuria, frequency, hematuria or incontinence  Neurological: No headaches, memory loss, loss of strength, numbness or tremors  Psychiatric: No depression, anxiety, mood swings or difficulty sleeping  Musculoskeletal: No joint pain or swelling; No muscle, back or extremity pain  Skin: No itching, burning, rashes or lesions   Lymph Nodes: No enlarged glands  Endocrine: No heat or cold intolerance; No hair loss, No h/o diabetes or thyroid dysfunction  Allergy and Immunologic: No hives or eczema    On Neurological Examination:    Mental Status - Pt is alert, awake, oriented X3. . Follows commands well and able to answer questions.    Speech -  Normal.    Cranial Nerves - Pupils 3 mm equal and reactive to light, extraocular eye movements intact. Pt has no visual field deficit.  Pt has no facial asymmetry. Facial sensation is intact.Tongue - is in midline.    Muscle tone - is normal all over. Moves all extremities equally. No asymmetry is seen.      Motor Exam - UE 5/5  LE 4+/5.    Sensory Exam - Pin prick, temperature, joint position and vibration are intact on either side. Pt withdraws all extremities equally on stimulation. No asymmetry seen. No complaints of tingling, numbness.      coordination:    Finger to nose: normal.      Deep tendon Reflexes - 2 plus all over.        Romberg - Negative.    Neck Supple -  Yes.     MEDICATIONS    aspirin  chewable 81 milliGRAM(s) Oral daily  atorvastatin 20 milliGRAM(s) Oral at bedtime  ATENolol  Tablet 25 milliGRAM(s) Oral daily  enoxaparin Injectable 40 milliGRAM(s) SubCutaneous every 24 hours  acetaminophen   Tablet 650 milliGRAM(s) Oral every 6 hours PRN  lactobacillus acidophilus 1 Tablet(s) Oral every 8 hours  lisinopril 10 milliGRAM(s) Oral daily  piperacillin/tazobactam IVPB.      piperacillin/tazobactam IVPB. 3.375 Gram(s) IV Intermittent every 8 hours      Allergies    No Known Allergies    Intolerances        LABS:  CBC Full  -  ( 18 Aug 2017 08:21 )  WBC Count : 6.8 K/uL  Hemoglobin : 12.1 g/dL  Hematocrit : 35.6 %  Platelet Count - Automated : 161 K/uL  Mean Cell Volume : 94.4 fl  Mean Cell Hemoglobin : 32.0 pg  Mean Cell Hemoglobin Concentration : 33.9 gm/dL  Auto Neutrophil # : x  Auto Lymphocyte # : x  Auto Monocyte # : x  Auto Eosinophil # : x  Auto Basophil # : x  Auto Neutrophil % : x  Auto Lymphocyte % : x  Auto Monocyte % : x  Auto Eosinophil % : x  Auto Basophil % : x      08-18    140  |  104  |  9   ----------------------------<  111<H>  3.6   |  32<H>  |  0.99    Ca    9.2      18 Aug 2017 08:21    TPro  6.4  /  Alb  2.3<L>  /  TBili  0.5  /  DBili  x   /  AST  80<H>  /  ALT  99<H>  /  AlkPhos  116  08-17    Hemoglobin A1C:     Vitamin B12     RADIOLOGY    ASSESSMENT AND PLAN:        ENCEPHALOPATHY LIKELY DUE FEBRILE ILLNESS RESOLVED.  DOUBT CNS INFECTION.  FEVER.    CONTINUE ANTIBIOTIC AS PER ID.  PATIENT NOT WILLING FOR BRAIN MRI ;? BG LESIONS..  Plan of care was discussed with family. Questions answered.  Would continue to follow.

## 2017-08-18 NOTE — PROGRESS NOTE ADULT - SUBJECTIVE AND OBJECTIVE BOX
pt seen  no issues  HIDA -  ICU Vital Signs Last 24 Hrs  T(C): 37.4 (18 Aug 2017 05:44), Max: 39.7 (17 Aug 2017 16:45)  T(F): 99.3 (18 Aug 2017 05:44), Max: 103.4 (17 Aug 2017 16:45)  HR: 53 (18 Aug 2017 05:44) (50 - 53)  BP: 155/78 (18 Aug 2017 05:44) (128/71 - 155/78)  BP(mean): --  ABP: --  ABP(mean): --  RR: 18 (18 Aug 2017 05:44) (18 - 18)  SpO2: 94% (18 Aug 2017 05:44) (94% - 98%)  NAd  soft NT/ND      71 yo with unexplained fevers.  No acute cholecystitis       cont workup

## 2017-08-18 NOTE — PROGRESS NOTE ADULT - SUBJECTIVE AND OBJECTIVE BOX
Follow up: OOB ambulating, NAD, Afebrile x 8 hrs    HPI:  Pt is a 71 y/o M with PMHx of HTN, CAD (s/p 2 stents, 1995), bladder cancer (in remission; dx 2013, treated with TURBT and serial surveillance cystoscopies) who presented to the ED with 3 days of fever and chills after a routine screening cystoscopy performed 8/8 (5 days prior to presentation).  Pt states he did receive 1 dose of antibiotics at the time of cystoscopy but does not recall the name.  Pt stated that the he first felt a chill on Thursday but felt well enough to play baseball on Friday.  However, on Friday evening he began experiencing fever (per pt's wife Tmax 103F), chills, and drenching sweats.  He was given Advil/Tylenol by his wife for the fever.  Pt also endorses 2 episodes of non-bloody emesis prior to presentation.  He has noted decreased oral intake since Friday due to nausea/vomiting and has not be drinking a significant amount of fluid.  Patient ever having denied dysuria, frequency, incontinence, or urgency, but notes that it "feels different" when he urinates and that his urine is "orange." He also endorses dizziness when walking.      In the ED, pt was febrile (103.2F). All other vital signs WNL.  Labs were significant for WBC18.0 with left shift; UA (+) for LE and nitrite, moderate bacteria.  Lactate 1.5. Urine/blood cxs ordered. CXR: mild cardiomegaly; EKG not yet done. In the ED, pt received ceftriaxone 1g IV, Tylenol 650 mg, and NS Bolus x3. (13 Aug 2017 12:40)      PAST MEDICAL & SURGICAL HISTORY:  Bladder cancer  HTN (hypertension)  H/O heart artery stent: x2 (1995)  S/P hernia surgery: (2007)      MEDICATIONS  (STANDING):  aspirin  chewable 81 milliGRAM(s) Oral daily  atorvastatin 20 milliGRAM(s) Oral at bedtime  ATENolol  Tablet 25 milliGRAM(s) Oral daily  enoxaparin Injectable 40 milliGRAM(s) SubCutaneous every 24 hours  lactobacillus acidophilus 1 Tablet(s) Oral every 8 hours  lisinopril 10 milliGRAM(s) Oral daily  piperacillin/tazobactam IVPB.      vancomycin  IVPB   IV Intermittent   piperacillin/tazobactam IVPB. 3.375 Gram(s) IV Intermittent every 8 hours  vancomycin  IVPB 1250 milliGRAM(s) IV Intermittent every 12 hours    MEDICATIONS  (PRN):  acetaminophen   Tablet 650 milliGRAM(s) Oral every 6 hours PRN For Temp greater than 38 C (100.4 F)      REVIEW OF SYSTEMS:    CONSTITUTIONAL: No weakness, fevers or chills  EYES: No visual changes, No diplopia  ENMT: No throat pain , No exudate  NECK: No pain or stiffness  RESPIRATORY: No cough, wheezing, hemoptysis; No shortness of breath  CARDIOVASCULAR: No chest pain or palpitations  GASTROINTESTINAL: No abdominal pain. No nausea, vomiting, or hematemesis; No diarrhea or constipation. No melena or hematochezia.  GENITOURINARY: No dysuria, frequency or hematuria  NEUROLOGICAL: No numbness or weakness  SKIN: No itching or rash  All other review of systems is negative unless indicated above    Vital Signs Last 24 Hrs  T(C): 37.4 (18 Aug 2017 05:44), Max: 39.7 (17 Aug 2017 16:45)  T(F): 99.3 (18 Aug 2017 05:44), Max: 103.4 (17 Aug 2017 16:45)  HR: 53 (18 Aug 2017 05:44) (50 - 57)  BP: 155/78 (18 Aug 2017 05:44) (128/71 - 157/79)  BP(mean): --  RR: 18 (18 Aug 2017 05:44) (18 - 20)  SpO2: 94% (18 Aug 2017 05:44) (94% - 99%)    I&O's Summary    17 Aug 2017 07:01  -  18 Aug 2017 07:00  --------------------------------------------------------  IN: 240 mL / OUT: 0 mL / NET: 240 mL        PHYSICAL EXAM:    Constitutional: NAD, awake and alert, well-developed  Eyes:  EOMI,  Pupils round, no lesions  ENMT: no exudate or erythema  Pulmonary: Non-labored, breath sounds are clear bilaterally, No wheezing, rales or rhonchi  Cardiovascular: PMI not palpable non-displaced Regular S1 and S2, no murmurs, rubs, gallops or clicks  Gastrointestinal: Bowel Sounds present, soft, nontender.   Lymph: No peripheral edema. No cervical lymphadenopathy.  Neurological: Alert, no focal deficits  Skin: No rashes. Changes of chronic venous stasis. No cyanosis.  Psych:  Mood & affect appropriate Confused.                                12.1   6.8   )-----------( 161      ( 18 Aug 2017 08:21 )             35.6     CBC Full  -  ( 18 Aug 2017 08:21 )  WBC Count : 6.8 K/uL  Hemoglobin : 12.1 g/dL  Hematocrit : 35.6 %  Platelet Count - Automated : 161 K/uL  Mean Cell Volume : 94.4 fl  Mean Cell Hemoglobin : 32.0 pg  Mean Cell Hemoglobin Concentration : 33.9 gm/dL  Auto Neutrophil # : x  Auto Lymphocyte # : x  Auto Monocyte # : x  Auto Eosinophil # : x  Auto Basophil # : x  Auto Neutrophil % : x  Auto Lymphocyte % : x  Auto Monocyte % : x  Auto Eosinophil % : x  Auto Basophil % : x    08-18    140  |  104  |  9   ----------------------------<  111<H>  3.6   |  32<H>  |  0.99    Ca    9.2      18 Aug 2017 08:21    TPro  6.4  /  Alb  2.3<L>  /  TBili  0.5  /  DBili  x   /  AST  80<H>  /  ALT  99<H>  /  AlkPhos  116  08-17 Follow up: OOB ambulating, NAD, Non productive cough,  Afebrile x 8 hrs    HPI:  Pt is a 71 y/o M with PMHx of HTN, CAD (s/p 2 stents, 1995), bladder cancer (in remission; dx 2013, treated with TURBT and serial surveillance cystoscopies) who presented to the ED with 3 days of fever and chills after a routine screening cystoscopy performed 8/8 (5 days prior to presentation).  Pt states he did receive 1 dose of antibiotics at the time of cystoscopy but does not recall the name.  Pt stated that the he first felt a chill on Thursday but felt well enough to play baseball on Friday.  However, on Friday evening he began experiencing fever (per pt's wife Tmax 103F), chills, and drenching sweats.  He was given Advil/Tylenol by his wife for the fever.  Pt also endorses 2 episodes of non-bloody emesis prior to presentation.  He has noted decreased oral intake since Friday due to nausea/vomiting and has not be drinking a significant amount of fluid.  Patient ever having denied dysuria, frequency, incontinence, or urgency, but notes that it "feels different" when he urinates and that his urine is "orange." He also endorses dizziness when walking.      In the ED, pt was febrile (103.2F). All other vital signs WNL.  Labs were significant for WBC18.0 with left shift; UA (+) for LE and nitrite, moderate bacteria.  Lactate 1.5. Urine/blood cxs ordered. CXR: mild cardiomegaly; EKG not yet done. In the ED, pt received ceftriaxone 1g IV, Tylenol 650 mg, and NS Bolus x3. (13 Aug 2017 12:40)      PAST MEDICAL & SURGICAL HISTORY:  Bladder cancer  HTN (hypertension)  H/O heart artery stent: x2 (1995)  S/P hernia surgery: (2007)      MEDICATIONS  (STANDING):  aspirin  chewable 81 milliGRAM(s) Oral daily  atorvastatin 20 milliGRAM(s) Oral at bedtime  ATENolol  Tablet 25 milliGRAM(s) Oral daily  enoxaparin Injectable 40 milliGRAM(s) SubCutaneous every 24 hours  lactobacillus acidophilus 1 Tablet(s) Oral every 8 hours  lisinopril 10 milliGRAM(s) Oral daily  piperacillin/tazobactam IVPB.      vancomycin  IVPB   IV Intermittent   piperacillin/tazobactam IVPB. 3.375 Gram(s) IV Intermittent every 8 hours  vancomycin  IVPB 1250 milliGRAM(s) IV Intermittent every 12 hours    MEDICATIONS  (PRN):  acetaminophen   Tablet 650 milliGRAM(s) Oral every 6 hours PRN For Temp greater than 38 C (100.4 F)      REVIEW OF SYSTEMS:    CONSTITUTIONAL: No weakness, fevers or chills  EYES: No visual changes, No diplopia  ENMT: No throat pain , No exudate  NECK: No pain or stiffness  RESPIRATORY: Cough, wheezing, N hemoptysis; CARLOS  CARDIOVASCULAR: No chest pain or palpitations  GASTROINTESTINAL: No abdominal pain. No nausea, vomiting, or hematemesis; No diarrhea or constipation. No melena or hematochezia.  GENITOURINARY: No dysuria, frequency or hematuria  NEUROLOGICAL: No numbness or weakness  SKIN: No itching or rash  All other review of systems is negative unless indicated above    Vital Signs Last 24 Hrs  T(C): 37.4 (18 Aug 2017 05:44), Max: 39.7 (17 Aug 2017 16:45)  T(F): 99.3 (18 Aug 2017 05:44), Max: 103.4 (17 Aug 2017 16:45)  HR: 53 (18 Aug 2017 05:44) (50 - 57)  BP: 155/78 (18 Aug 2017 05:44) (128/71 - 157/79)  BP(mean): --  RR: 18 (18 Aug 2017 05:44) (18 - 20)  SpO2: 94% (18 Aug 2017 05:44) (94% - 99%)    I&O's Summary    17 Aug 2017 07:01  -  18 Aug 2017 07:00  --------------------------------------------------------  IN: 240 mL / OUT: 0 mL / NET: 240 mL        PHYSICAL EXAM:    Constitutional: NAD, awake and alert, well-developed  Eyes:  EOMI,  Pupils round, no lesions  ENMT: no exudate or erythema  Pulmonary: +wheezing in b/l upper and lower lobes, no rales or rhonchi  Cardiovascular: PMI not palpable non-displaced Regular S1 and S2, no murmurs, rubs, gallops or clicks  Gastrointestinal: Bowel Sounds present, soft, nontender.   Lymph: No peripheral edema. No cervical lymphadenopathy.  Neurological: Alert, no focal deficits  Skin: No rashes. Changes of chronic venous stasis. No cyanosis.  Psych:  Mood & affect appropriate Confused.                                12.1   6.8   )-----------( 161      ( 18 Aug 2017 08:21 )             35.6     CBC Full  -  ( 18 Aug 2017 08:21 )  WBC Count : 6.8 K/uL  Hemoglobin : 12.1 g/dL  Hematocrit : 35.6 %  Platelet Count - Automated : 161 K/uL  Mean Cell Volume : 94.4 fl  Mean Cell Hemoglobin : 32.0 pg  Mean Cell Hemoglobin Concentration : 33.9 gm/dL  Auto Neutrophil # : x  Auto Lymphocyte # : x  Auto Monocyte # : x  Auto Eosinophil # : x  Auto Basophil # : x  Auto Neutrophil % : x  Auto Lymphocyte % : x  Auto Monocyte % : x  Auto Eosinophil % : x  Auto Basophil % : x    08-18    140  |  104  |  9   ----------------------------<  111<H>  3.6   |  32<H>  |  0.99    Ca    9.2      18 Aug 2017 08:21    TPro  6.4  /  Alb  2.3<L>  /  TBili  0.5  /  DBili  x   /  AST  80<H>  /  ALT  99<H>  /  AlkPhos  116  08-17

## 2017-08-18 NOTE — PROGRESS NOTE ADULT - ASSESSMENT
FUO  Fevers perhaps decreased  Feeling better.  Unclear if it has anything to do with antibiotic change  Vancomycin level subtherapeutic for MRSA and no clinical suspicion for such

## 2017-08-18 NOTE — PROGRESS NOTE ADULT - ASSESSMENT
72 year old male with PMHx of HTN, MI (1995), CAD (s/p 2 stents, 1995), Bladder CA (in remission, dx 2013, s/p TURBT and serial surveillance cystoscopies) who presented to the ED with 3 days of fever and chills after a routine screening cystoscopy performed 8/8. Patient admitted for sepsis 2/2 UTI on abx. Patient continued to spike fevers through hospital course despite tylenol and abx. Primary team is working up patient for cause of fever.  With ivf administration he has developed dyspnea and wheezing with hypoxia.      -sob, improved with supplemental 02.  -Etiology of dyspnea likely related to iatrogenic fluid overload 2/2 treatment of sepsis, less likely an acute cardiac process  -he received 40mg IV lasix. Recommend additional dose of IV Lasix 40 this evening  - Would avoid excess fluids for now unless patient develops hypotension.   - although his biventr fxn was normal recently, would repeat echo to evaluate for acute change  -check bnp with am labs  -repeat ekg  -Continue ASA 81mg POqd   -HR stable in the 60's, BP fluctuating between 128/71 - 155/80.   -Continue Atenolol, ACEI; may consider increasing antihypertensive with parameters  -fevers, with unclear etiology as of now, Urosepsis? cholecystitis? Urology and ID following.   -Maintain and replete electrolytes as needed. Keep K>4, Mg>2.   -further cardiac workup will depend on clinical course  -will continue to follow 72 year old male with PMHx of HTN, MI (1995), CAD (s/p 2 stents, 1995), Bladder CA (in remission, dx 2013, s/p TURBT and serial surveillance cystoscopies) who presented to the ED with 3 days of fever and chills after a routine screening cystoscopy performed 8/8. Patient admitted for sepsis 2/2 UTI on abx. Patient continued to spike fevers through hospital course despite tylenol and abx. Primary team is working up patient for cause of fever.  With ivf administration he has developed dyspnea and wheezing with hypoxia.      -sob, improved with supplemental 02.  -Etiology of dyspnea likely related to iatrogenic fluid overload 2/2 treatment of sepsis, less likely an acute cardiac process  -Ordered  40mg IV lasix. Recommend additional dose of IV Lasix 40 this evening  - Would avoid excess fluids for now unless patient develops hypotension.   - although his biventr fxn was normal recently, would repeat echo to evaluate for acute change  -check bnp with am labs  -repeat ekg  -Continue ASA 81mg POqd   -HR stable in the 50s - 60's, BP fluctuating between 128/71 - 155/80.   -Continue Atenolol, ACEI; may consider increasing antihypertensive with parameters  -fevers, with unclear etiology as of now, Urosepsis? cholecystitis? Urology and ID following.   -Maintain and replete electrolytes as needed. Keep K>4, Mg>2.   -further cardiac workup will depend on clinical course  -will continue to follow

## 2017-08-18 NOTE — DISCHARGE NOTE ADULT - CARE PROVIDER_API CALL
Weil, Peter A (MD), Critical Care Medicine; Internal Medicine; Pulmonary Disease  [address]  [address]  [place] [address]  Phone: [phone]  Fax: [phone]    Victor M Lan), Urology  [address]  [address]  [place] [address]  Phone: [phone]  Fax: [phone]    Filipe Longo), Gastroenterology  [address]  [address]  [place], [place] [id number]  Phone: [phone]  Fax: [phone]

## 2017-08-18 NOTE — PROGRESS NOTE ADULT - PROBLEM SELECTOR PROBLEM 5
Need for prophylactic measure
Pulmonary nodule, left

## 2017-08-18 NOTE — PROGRESS NOTE ADULT - PROBLEM SELECTOR PLAN 5
Encourage patient ambulation as tolerated.   DVT Ppx Lovenox 40 SQ daily
Doubt acute or related to fevers presently  Management as per primary service

## 2017-08-18 NOTE — DISCHARGE NOTE ADULT - PATIENT PORTAL LINK FT
“You can access the Follow[facility] Patient Portal, offered by [facility], by registering with the following website: [email or web address]”

## 2017-08-18 NOTE — DISCHARGE NOTE ADULT - PLAN OF CARE
Clinical improvement Continue Surya and Mary Ann  Continue to follow temps and CBC follow up with Dr Lan  in 2 weeks follow up with Dr Lan in 2 weeks follow up with primary doctor next week

## 2017-08-18 NOTE — DISCHARGE NOTE ADULT - MEDICATION SUMMARY - MEDICATIONS TO TAKE
I will START or STAY ON the medications listed below when I get home from the hospital:    aspirin 81 mg oral tablet  -- 1 tab(s) by mouth once a day  -- Indication: For Supplement    ramipril 5 mg oral capsule  -- 1 cap(s) by mouth once a day  -- Indication: For HTN (hypertension)    atorvastatin 20 mg oral tablet  -- 1 tab(s) by mouth once a day  -- Indication: For Hyperlipidemia    atenolol 25 mg oral tablet  -- 1 tab(s) by mouth once a day  -- Indication: For HTN (hypertension)    Vitamin D3 1000 intl units oral capsule  -- 1 cap(s) by mouth once a day  -- Indication: For Supplement

## 2017-08-18 NOTE — PROGRESS NOTE ADULT - PROBLEM SELECTOR PLAN 6
Will dw pt's outpt pcp/pul dr weil - should have outpt PET
will dw pt's outpt pcp/pul dr weil - should have outpt PET

## 2017-08-19 VITALS
TEMPERATURE: 98 F | OXYGEN SATURATION: 97 % | RESPIRATION RATE: 16 BRPM | HEART RATE: 55 BPM | DIASTOLIC BLOOD PRESSURE: 81 MMHG | SYSTOLIC BLOOD PRESSURE: 164 MMHG

## 2017-08-19 LAB
ANION GAP SERPL CALC-SCNC: 6 MMOL/L — SIGNIFICANT CHANGE UP (ref 5–17)
BUN SERPL-MCNC: 11 MG/DL — SIGNIFICANT CHANGE UP (ref 7–23)
CALCIUM SERPL-MCNC: 9.6 MG/DL — SIGNIFICANT CHANGE UP (ref 8.5–10.1)
CHLORIDE SERPL-SCNC: 104 MMOL/L — SIGNIFICANT CHANGE UP (ref 96–108)
CO2 SERPL-SCNC: 30 MMOL/L — SIGNIFICANT CHANGE UP (ref 22–31)
CREAT SERPL-MCNC: 0.97 MG/DL — SIGNIFICANT CHANGE UP (ref 0.5–1.3)
CULTURE RESULTS: NO GROWTH — SIGNIFICANT CHANGE UP
GLUCOSE SERPL-MCNC: 90 MG/DL — SIGNIFICANT CHANGE UP (ref 70–99)
HCT VFR BLD CALC: 34.9 % — LOW (ref 39–50)
HGB BLD-MCNC: 12.5 G/DL — LOW (ref 13–17)
MCHC RBC-ENTMCNC: 33.4 PG — SIGNIFICANT CHANGE UP (ref 27–34)
MCHC RBC-ENTMCNC: 35.7 GM/DL — SIGNIFICANT CHANGE UP (ref 32–36)
MCV RBC AUTO: 93.7 FL — SIGNIFICANT CHANGE UP (ref 80–100)
PLATELET # BLD AUTO: 197 K/UL — SIGNIFICANT CHANGE UP (ref 150–400)
POTASSIUM SERPL-MCNC: 4.1 MMOL/L — SIGNIFICANT CHANGE UP (ref 3.5–5.3)
POTASSIUM SERPL-SCNC: 4.1 MMOL/L — SIGNIFICANT CHANGE UP (ref 3.5–5.3)
RBC # BLD: 3.72 M/UL — LOW (ref 4.2–5.8)
RBC # FLD: 11.9 % — SIGNIFICANT CHANGE UP (ref 10.3–14.5)
SODIUM SERPL-SCNC: 140 MMOL/L — SIGNIFICANT CHANGE UP (ref 135–145)
SPECIMEN SOURCE: SIGNIFICANT CHANGE UP
WBC # BLD: 7.6 K/UL — SIGNIFICANT CHANGE UP (ref 3.8–10.5)
WBC # FLD AUTO: 7.6 K/UL — SIGNIFICANT CHANGE UP (ref 3.8–10.5)

## 2017-08-19 PROCEDURE — 93306 TTE W/DOPPLER COMPLETE: CPT | Mod: 26

## 2017-08-19 PROCEDURE — 99232 SBSQ HOSP IP/OBS MODERATE 35: CPT

## 2017-08-19 RX ADMIN — Medication 1 TABLET(S): at 06:21

## 2017-08-19 RX ADMIN — Medication 81 MILLIGRAM(S): at 11:36

## 2017-08-19 RX ADMIN — ENOXAPARIN SODIUM 40 MILLIGRAM(S): 100 INJECTION SUBCUTANEOUS at 11:36

## 2017-08-19 RX ADMIN — LISINOPRIL 10 MILLIGRAM(S): 2.5 TABLET ORAL at 06:21

## 2017-08-19 RX ADMIN — PIPERACILLIN AND TAZOBACTAM 25 GRAM(S): 4; .5 INJECTION, POWDER, LYOPHILIZED, FOR SOLUTION INTRAVENOUS at 06:21

## 2017-08-19 RX ADMIN — ATENOLOL 25 MILLIGRAM(S): 25 TABLET ORAL at 06:21

## 2017-08-19 RX ADMIN — Medication 1 TABLET(S): at 13:24

## 2017-08-19 NOTE — PROGRESS NOTE ADULT - PROBLEM SELECTOR PROBLEM 2
CAD (coronary artery disease)
Delirium
Delirium
UTI (urinary tract infection)
Fever and chills

## 2017-08-19 NOTE — PROGRESS NOTE ADULT - SUBJECTIVE AND OBJECTIVE BOX
Subjective: Pt without complaints, denies abdominal pain.    Objective:  Vital Signs Last 24 Hrs  T(C): 37 (19 Aug 2017 05:12), Max: 37 (19 Aug 2017 05:12)  T(F): 98.6 (19 Aug 2017 05:12), Max: 98.6 (19 Aug 2017 05:12)  HR: 50 (19 Aug 2017 05:12) (48 - 60)  BP: 177/90 (19 Aug 2017 05:12) (125/74 - 177/90)  BP(mean): --  RR: 17 (19 Aug 2017 05:12) (17 - 17)  SpO2: 96% (19 Aug 2017 05:12) (68% - 96%)    Heent: SUMA, FREOM  Pul: clear  Cor: RSR, without murmurs  Abdomen: normal bowel sounds, without distension, without tenderness  Extremities: without edema, motor/sensory intact, without calf pain, Homans sign negative.                        12.5   7.6   )-----------( 197      ( 19 Aug 2017 08:16 )             34.9       08-19    140  |  104  |  11  ----------------------------<  90  4.1   |  30  |  0.97    Ca    9.6      19 Aug 2017 08:16    TPro  6.4  /  Alb  2.3<L>  /  TBili  0.5  /  DBili  x   /  AST  80<H>  /  ALT  99<H>  /  AlkPhos  116  08-17

## 2017-08-19 NOTE — PROGRESS NOTE ADULT - ATTENDING COMMENTS
Patient seen/examined at bedside. Agree with the above with the following corrections/additions:  Briefly, he is a 72 year old male with PMHx of HTN, MI (1995), CAD (s/p 2 stents, 1995), Bladder CA (in remission, dx 2013, s/p TURBT and serial surveillance cystoscopies) who presented to the ED with 3 days of fever and chills after a routine screening cystoscopy.  Mental status has improved with treatment of infection. S/p IV lasix. Seems to be more or less euvolemic on exam. Can repeat TTE to evaluate for changes in LV function (he was scheduled to have an echo as outpatient in 1-2 weeks). Continue current medical regimen.
Thank you for the courtesy of this referral.  I'll sign off at this time.     Kneny Khan MD  [phone]
Discussed with Dr. Wynn.  Discussed with patient. wife, daughter in detail at bedside.  All Q's answered.    Kenny Khan MD  [phone]
Discussed with patient and his wife in detail.  All questions answered.    Kenny Khan MD  [phone]
Discussed with patient and his wife in detail.  Left message for Dr. Love.  Discussed with Dr. Stephens.    Kenny Khan MD  [phone]

## 2017-08-19 NOTE — PROGRESS NOTE ADULT - ASSESSMENT
72 year old male with PMHx of HTN, MI (1995), CAD (s/p 2 stents, 1995), Bladder CA (in remission, dx 2013, s/p TURBT and serial surveillance cystoscopies) who presented to the ED with 3 days of fever and chills after a routine screening cystoscopy.  Mental status has improved with treatment of infection. S/p IV lasix. Seems to be more or less euvolemic on exam.   Repeat TTE to evaluate for changes in LV function as outpatient (he was scheduled to have an echo as outpatient in 1-2 weeks). Continue current medical regimen.  Etiology of dyspnea likely related to iatrogenic fluid overload 2/2 treatment of sepsis, less likely an acute cardiac process  Continue ASA 81mg and statin  HR stable in the 50s - 60's, BP fluctuating between 128/71 - 155/80. It was elevated this morning in the setting of anxiety  Continue Atenolol, ACEI. Can increase lisinopril to 20 mg  Close outpatient cardiac follow up.

## 2017-08-19 NOTE — PROGRESS NOTE ADULT - SUBJECTIVE AND OBJECTIVE BOX
[facility] - Cheryl Yanes, Luis, Aidan, Xiomara, Eva Jarrett  Office Number:  [phone]    Patient resting comfortably in bed in NAD.  Laying flat with no respiratory distress.  No complaints of chest pain, dyspnea, palpitations, PND, or orthopnea. Wants to go home already    ROS: negative unless otherwise mentioned.    Telemetry:  not on telemetry    MEDICATIONS  (STANDING):  aspirin  chewable 81 milliGRAM(s) Oral daily  atorvastatin 20 milliGRAM(s) Oral at bedtime  ATENolol  Tablet 25 milliGRAM(s) Oral daily  enoxaparin Injectable 40 milliGRAM(s) SubCutaneous every 24 hours  lactobacillus acidophilus 1 Tablet(s) Oral every 8 hours  lisinopril 10 milliGRAM(s) Oral daily  piperacillin/tazobactam IVPB.      piperacillin/tazobactam IVPB. 3.375 Gram(s) IV Intermittent every 8 hours    MEDICATIONS  (PRN):  acetaminophen   Tablet 650 milliGRAM(s) Oral every 6 hours PRN For Temp greater than 38 C (100.4 F)      Allergies    No Known Allergies    Intolerances        Vital Signs Last 24 Hrs  T(C): 37 (19 Aug 2017 05:12), Max: 37 (19 Aug 2017 05:12)  T(F): 98.6 (19 Aug 2017 05:12), Max: 98.6 (19 Aug 2017 05:12)  HR: 50 (19 Aug 2017 05:12) (48 - 60)  BP: 177/90 (19 Aug 2017 05:12) (125/74 - 177/90)  BP(mean): --  RR: 17 (19 Aug 2017 05:12) (17 - 17)  SpO2: 96% (19 Aug 2017 05:12) (68% - 96%)    I&O's Summary      ON EXAM:    Constitutional: NAD, awake and alert, well-developed  Eyes:  EOMI,  Pupils round, no lesions  ENMT: no exudate or erythema  Pulmonary:  no wheezing in b/l upper and lower lobes, no rales or rhonchi  Cardiovascular: PMI palpable non-displaced Regular S1 and S2, no murmurs, rubs, gallops or clicks  Gastrointestinal: Bowel Sounds present, soft, nontender.   Lymph: No peripheral edema. No cervical lymphadenopathy.  Neurological: Alert, no focal deficits  Skin: No rashes. No cyanosis.  Psych:  Mood & affect appropriate    LABS: All Labs Reviewed:                        12.5   7.6   )-----------( 197      ( 19 Aug 2017 08:16 )             34.9                         12.1   6.8   )-----------( 161      ( 18 Aug 2017 08:21 )             35.6                         12.1   6.4   )-----------( 138      ( 17 Aug 2017 16:50 )             34.8     19 Aug 2017 08:16    140    |  104    |  11     ----------------------------<  90     4.1     |  30     |  0.97   18 Aug 2017 08:21    140    |  104    |  9      ----------------------------<  111    3.6     |  32     |  0.99   17 Aug 2017 16:50    137    |  103    |  10     ----------------------------<  127    3.6     |  25     |  0.92     Ca    9.6        19 Aug 2017 08:16  Ca    9.2        18 Aug 2017 08:21  Ca    8.9        17 Aug 2017 16:50    TPro  6.4    /  Alb  2.3    /  TBili  0.5    /  DBili  x      /  AST  80     /  ALT  99     /  AlkPhos  116    17 Aug 2017 16:50  TPro  5.9    /  Alb  2.2    /  TBili  0.3    /  DBili  .20    /  AST  82     /  ALT  97     /  AlkPhos  102    17 Aug 2017 08:06          Blood Culture:   08-17 @ 16:50  Pro Bnp [id number]

## 2017-08-19 NOTE — PROGRESS NOTE ADULT - PROBLEM SELECTOR PROBLEM 1
Fever and chills
Sepsis
Acute cystitis without hematuria
Sepsis, due to unspecified organism
UTI (urinary tract infection)
Fever and chills
Fever and chills
R/O Cholecystitis
UTI (urinary tract infection)

## 2017-08-19 NOTE — PROGRESS NOTE ADULT - SUBJECTIVE AND OBJECTIVE BOX
CHIEF COMPLAINT:  Feels fine, no voiding complaints, cleared fro discharge by ID and wants to go home.        PAST MEDICAL & SURGICAL HISTORY:  Bladder cancer  HTN (hypertension)  H/O heart artery stent: x2 (1995)  S/P hernia surgery: (2007)        MEDICATIONS  (STANDING):  aspirin  chewable 81 milliGRAM(s) Oral daily  atorvastatin 20 milliGRAM(s) Oral at bedtime  ATENolol  Tablet 25 milliGRAM(s) Oral daily  enoxaparin Injectable 40 milliGRAM(s) SubCutaneous every 24 hours  lactobacillus acidophilus 1 Tablet(s) Oral every 8 hours  lisinopril 10 milliGRAM(s) Oral daily  piperacillin/tazobactam IVPB.      piperacillin/tazobactam IVPB. 3.375 Gram(s) IV Intermittent every 8 hours    MEDICATIONS  (PRN):  acetaminophen   Tablet 650 milliGRAM(s) Oral every 6 hours PRN For Temp greater than 38 C (100.4 F)      Allergies    No Known Allergies    Intolerances        :    FAMILY HISTORY:  Family history of colon cancer (Sibling)  Family history of heart disease (Father, Mother)  Family history of pancreatic cancer (Father)      Vital Signs Last 24 Hrs  T(C): 37 (19 Aug 2017 05:12), Max: 37 (19 Aug 2017 05:12)  T(F): 98.6 (19 Aug 2017 05:12), Max: 98.6 (19 Aug 2017 05:12)  HR: 50 (19 Aug 2017 05:12) (48 - 60)  BP: 177/90 (19 Aug 2017 05:12) (125/74 - 177/90)  BP(mean): --  RR: 17 (19 Aug 2017 05:12) (17 - 17)  SpO2: 96% (19 Aug 2017 05:12) (68% - 96%)    PHYSICAL EXAM:    Constitutional: NAD, well-developed    Asymptomatic, AO  Abd: BS+, soft, NT/ND, No CVAT  : Normal phallus, patent meatus ,bilateral descended testes, no masses  Extremities: No peripheral edema    LABS:                        12.5   7.6   )-----------( 197      ( 19 Aug 2017 08:16 )             34.9     08-19    140  |  104  |  11  ----------------------------<  90  4.1   |  30  |  0.97    Ca    9.6      19 Aug 2017 08:16    TPro  6.4  /  Alb  2.3<L>  /  TBili  0.5  /  DBili  x   /  AST  80<H>  /  ALT  99<H>  /  AlkPhos  116  08-17        Urine Culture:   Hemoglobin: 12.5 g/dL (08-19 @ 08:16)  Hematocrit: 34.9 % (08-19 @ 08:16)  Hemoglobin: 12.1 g/dL (08-18 @ 08:21)  Hematocrit: 35.6 % (08-18 @ 08:21)  Hemoglobin: 12.1 g/dL (08-17 @ 16:50)  Hematocrit: 34.8 % (08-17 @ 16:50)      RADIOLOGY & ADDITIONAL STUDIES:

## 2017-08-19 NOTE — PROGRESS NOTE ADULT - PROBLEM SELECTOR PLAN 3
Chronic. Continue atenolol with hold parameters.  Continue ena-i at half dose for now
Chronic. Continue atenolol with hold parameters.  hold ace-i as bp is borderline
Chronic. Continue atenolol with hold parameters.  resume ena-i at half dose for now
SHIN per patient  Management as per urology
Adequately Rx'd, but was still febrile  Likely not the reason for presenting issues
SHIN
SHIN per patient  Management as per urology

## 2017-08-19 NOTE — PROGRESS NOTE ADULT - PROVIDER SPECIALTY LIST ADULT
Cardiology
Cardiology
Hospitalist
Infectious Disease
Internal Medicine
Neurology
Neurology
Surgery
Surgery
Urology

## 2017-08-19 NOTE — PROGRESS NOTE ADULT - PROBLEM SELECTOR PROBLEM 3
Bladder cancer
HTN (hypertension)
Acute cystitis without hematuria
Bladder cancer
Bladder cancer

## 2017-08-19 NOTE — PROGRESS NOTE ADULT - ASSESSMENT
Acute febrile illness  Unrevealing workup  Fevers appear resolved.  Sampling of anterior nares for RVP lower yield than deeper specimen. Cannot draw any firm conclusions.  Cx all NTD  Imaging no clear focus of infection  At this point, no ID objection to discharge  "Red flags" addressed with the patient and his wife

## 2017-08-19 NOTE — PROGRESS NOTE ADULT - SUBJECTIVE AND OBJECTIVE BOX
[facility], Division of Infectious Diseases  [facility]    Name: DESTINEE LAMB  Age: 72y  Gender: Male  MRN: [id number]    Interval History--  Notes reviewed. Spoke w RN yesterday-- RVP not a good sample, patient slapped RN's hand away when acquiring. No fevers, chills, or rigors. Feels back to normal, want to go to the gym/play ball.    Past Medical History--  Bladder cancer  HTN (hypertension)  H/O heart artery stent  S/P hernia surgery      For details regarding the patient's social history, family history, and other miscellaneous elements, please refer the initial infectious diseases consultation and/or the admitting history and physical examination for this admission.    Allergies    No Known Allergies    Intolerances        Medications--  Antibiotics:  piperacillin/tazobactam IVPB.      piperacillin/tazobactam IVPB. 3.375 Gram(s) IV Intermittent every 8 hours    Immunologic:    Other:  aspirin  chewable  atorvastatin  ATENolol  Tablet  enoxaparin Injectable  acetaminophen   Tablet PRN  lactobacillus acidophilus  lisinopril      Review of Systems--  Review of systems otherwise unchanged compared with yesterday except as previously noted.    Physical Examination--  Vital Signs: T(F): 98.6 (08-19-17 @ 05:12), Max: 98.6 (08-19-17 @ 05:12)  HR: 50 (08-19-17 @ 05:12)  BP: 177/90 (08-19-17 @ 05:12)  RR: 17 (08-19-17 @ 05:12)  SpO2: 96% (08-19-17 @ 05:12)  Wt(kg): --  eneral: Nontoxic-appearing Male in no acute distress. Less confused.  HEENT: AT/NC. PERRL. EOMI. Anicteric. Conjunctiva pink and moist. Oropharynx clear. Dentition fair.  Neck: Not rigid. No sense of mass.  Nodes: None palpable.  Lungs: Clear bilaterally without rales, wheezing or ronchi  Heart: Regular rate and rhythm. No Murmur. No rub. No gallop. No palpable thrill.  Abdomen: Bowel sounds present and normoactive. Soft. Nondistended. Nontender. No sense of mass. No organomegaly.  Back: No spinal tenderness. No costovertebral angle tenderness.   Extremities: No cyanosis or clubbing. No edema.   Skin: Warm. Dry. Good turgor. No rash. No vasculitic stigmata.  Psychiatric: Appropriate affect and mood for situation.       Laboratory Studies--  CBC                        12.1   6.8   )-----------( 161      ( 18 Aug 2017 08:21 )             35.6       Chemistries  08-18    140  |  104  |  9   ----------------------------<  111<H>  3.6   |  32<H>  |  0.99    Ca    9.2      18 Aug 2017 08:21    TPro  6.4  /  Alb  2.3<L>  /  TBili  0.5  /  DBili  x   /  AST  80<H>  /  ALT  99<H>  /  AlkPhos  116  08-17      Culture Data  Culture - Blood (08.17.17 @ 22:04)    Specimen Source: .Blood Blood-Venous    Culture Results:   No growth to date.    Culture - Blood (08.17.17 @ 22:03)    Specimen Source: .Blood Blood-Venous    Culture Results:   No growth to date.    Culture - Blood (08.16.17 @ 18:29)    Specimen Source: .Blood Blood    Culture Results:   No growth to date.    Culture - Urine (08.16.17 @ 16:18)    Specimen Source: .Urine Clean Catch (Midstream)    Culture Results:   No growth    Culture - Blood (08.16.17 @ 12:35)    Specimen Source: .Blood Blood    Culture Results:   No growth to date.

## 2017-08-19 NOTE — PROGRESS NOTE ADULT - PROBLEM SELECTOR PLAN 2
Appears resolved  Suspect related to fevers
As above.  Further workup pending HIDA  Check vancomycin trough, target 15-20 for now  Monitor CBC, chemistries
chronic; stable.  Continue atorvastatin, atenolol, asa  Fluid overload seems improved clinically - hold IVF
chronic; stable. Continue atorvastatin, atenolol, asa
chronic; stable. Continue atorvastatin, atenolol, asa  abnormal ekg as noted- will contact his cardiologist's office in am tomorrow to have old ekg faxed
chronic; stable. Continue atorvastatin, atenolol, asa  abnormal ekg as noted- will contact his cardiologist's office in am tomorrow to have old ekg faxed
chronic; stable. Continue atorvastatin, atenolol, asa  fluid overload- dc ivf, lasix X 1, cardio eval w dr jennings et al
Adequate Rx  Follow for Sx
Suspect related to fevers  Doubt primary CNS infection  Neuro input appreciated

## 2017-08-19 NOTE — PROGRESS NOTE ADULT - PROBLEM SELECTOR PLAN 1
CT c/w cystitis, ID is seeing pt because he is not responding to appropriate abiots as we would expect. Continue abiots per ID
Clinically improved, continue magt of abiots per ID. Will f/u.
HIDA scan negative, unlikely cholecystitis  Continue Vanco and Zosyn  Hold Fluids for fluid overload  Monitor labs
Likely 2/2 UTI  Admit to [facility]  continue ceftriaxone 1g IV daily  continue Tylenol PRN for fevers  continue IV NS @ 75 ml/hr  f/u urine, blood cultures  Zofran PRN for nausea/vomiting  Encourage PO intake of fluids as tolerated  hopeful dc home tomorrow if remains afebrile
Likely 2/2 UTI  Admit to [facility]  continue ceftriaxone 1g IV until cultures finalized  continue Tylenol PRN for fevers  continue IV NS @ 75 ml/hr  f/u urine, blood cultures  Zofran PRN for nausea/vomiting  Encourage PO intake of fluids as tolerated
Likely 2/2 UTI, gu fu noted  on iv rocephin, may need to increase dose as he is having persistant fevers, will discuss with id  repanculture ordered  rpt cxr today ordered
Secondary to E. coli on ceftriaxone. Clinically improving.  Anticipate dc in 24 hours.
Stop antibiotics
possible cholecystitis  for HIDA today
CXR  If negative, CT C/A/P  Recheck blood cx  Will broaden abx, though completely empiric in nature
Check RVP  Stop Vancomycin  Continue Zosyn  Follow up culture data
HIDA scan  If +, surgery eval  LFT  Continue antibiotics
culture with ecoli and sensitivity noted  cont ceftriaxone today  monitor temp curve  oob as tolerated  will be able to change to oral meds given sensitivity pattern

## 2017-08-21 LAB
CULTURE RESULTS: SIGNIFICANT CHANGE UP
CULTURE RESULTS: SIGNIFICANT CHANGE UP
SPECIMEN SOURCE: SIGNIFICANT CHANGE UP
SPECIMEN SOURCE: SIGNIFICANT CHANGE UP

## 2017-10-19 PROCEDURE — 81001 URINALYSIS AUTO W/SCOPE: CPT

## 2017-10-19 PROCEDURE — 78226 HEPATOBILIARY SYSTEM IMAGING: CPT

## 2017-10-19 PROCEDURE — 82803 BLOOD GASES ANY COMBINATION: CPT

## 2017-10-19 PROCEDURE — 83605 ASSAY OF LACTIC ACID: CPT

## 2017-10-19 PROCEDURE — 87486 CHLMYD PNEUM DNA AMP PROBE: CPT

## 2017-10-19 PROCEDURE — 96375 TX/PRO/DX INJ NEW DRUG ADDON: CPT

## 2017-10-19 PROCEDURE — 36415 COLL VENOUS BLD VENIPUNCTURE: CPT

## 2017-10-19 PROCEDURE — 80048 BASIC METABOLIC PNL TOTAL CA: CPT

## 2017-10-19 PROCEDURE — 80202 ASSAY OF VANCOMYCIN: CPT

## 2017-10-19 PROCEDURE — 74177 CT ABD & PELVIS W/CONTRAST: CPT

## 2017-10-19 PROCEDURE — 96372 THER/PROPH/DIAG INJ SC/IM: CPT | Mod: 59

## 2017-10-19 PROCEDURE — 83880 ASSAY OF NATRIURETIC PEPTIDE: CPT

## 2017-10-19 PROCEDURE — 36600 WITHDRAWAL OF ARTERIAL BLOOD: CPT

## 2017-10-19 PROCEDURE — 71045 X-RAY EXAM CHEST 1 VIEW: CPT

## 2017-10-19 PROCEDURE — 84145 PROCALCITONIN (PCT): CPT

## 2017-10-19 PROCEDURE — 99285 EMERGENCY DEPT VISIT HI MDM: CPT | Mod: 25

## 2017-10-19 PROCEDURE — C8929: CPT

## 2017-10-19 PROCEDURE — 87040 BLOOD CULTURE FOR BACTERIA: CPT

## 2017-10-19 PROCEDURE — 87633 RESP VIRUS 12-25 TARGETS: CPT

## 2017-10-19 PROCEDURE — 96365 THER/PROPH/DIAG IV INF INIT: CPT

## 2017-10-19 PROCEDURE — 85730 THROMBOPLASTIN TIME PARTIAL: CPT

## 2017-10-19 PROCEDURE — 87798 DETECT AGENT NOS DNA AMP: CPT

## 2017-10-19 PROCEDURE — 85610 PROTHROMBIN TIME: CPT

## 2017-10-19 PROCEDURE — 94640 AIRWAY INHALATION TREATMENT: CPT

## 2017-10-19 PROCEDURE — 85027 COMPLETE CBC AUTOMATED: CPT

## 2017-10-19 PROCEDURE — 80053 COMPREHEN METABOLIC PANEL: CPT

## 2017-10-19 PROCEDURE — 96376 TX/PRO/DX INJ SAME DRUG ADON: CPT

## 2017-10-19 PROCEDURE — A9537: CPT

## 2017-10-19 PROCEDURE — 80076 HEPATIC FUNCTION PANEL: CPT

## 2017-10-19 PROCEDURE — 71046 X-RAY EXAM CHEST 2 VIEWS: CPT

## 2017-10-19 PROCEDURE — 93005 ELECTROCARDIOGRAM TRACING: CPT

## 2017-10-19 PROCEDURE — 70450 CT HEAD/BRAIN W/O DYE: CPT

## 2017-10-19 PROCEDURE — 87186 SC STD MICRODIL/AGAR DIL: CPT

## 2017-10-19 PROCEDURE — 76770 US EXAM ABDO BACK WALL COMP: CPT

## 2017-10-19 PROCEDURE — 87581 M.PNEUMON DNA AMP PROBE: CPT

## 2017-10-19 PROCEDURE — 99221 1ST HOSP IP/OBS SF/LOW 40: CPT

## 2017-10-19 PROCEDURE — 82140 ASSAY OF AMMONIA: CPT

## 2017-10-19 PROCEDURE — 94760 N-INVAS EAR/PLS OXIMETRY 1: CPT

## 2017-10-19 PROCEDURE — 71260 CT THORAX DX C+: CPT

## 2017-10-19 PROCEDURE — 87086 URINE CULTURE/COLONY COUNT: CPT

## 2018-03-22 PROBLEM — C67.9 MALIGNANT NEOPLASM OF BLADDER, UNSPECIFIED: Chronic | Status: ACTIVE | Noted: 2017-08-13

## 2018-03-22 PROBLEM — I10 ESSENTIAL (PRIMARY) HYPERTENSION: Chronic | Status: ACTIVE | Noted: 2017-08-13

## 2018-04-04 ENCOUNTER — APPOINTMENT (OUTPATIENT)
Dept: THORACIC SURGERY | Facility: CLINIC | Age: 73
End: 2018-04-04

## 2019-04-22 NOTE — ED PROVIDER NOTE - SECONDARY DIAGNOSIS.
Received a refill request for humalog kwikpen from  [facility]'s pharmacy.    Pt previously on novolog insulin.  Writer called [facility]'s pharmacy.   Pt is currently receiving novolog and a new prescription is not indicated. Disregard this request.       
UTI (urinary tract infection)
